# Patient Record
Sex: FEMALE | Race: WHITE | NOT HISPANIC OR LATINO | Employment: OTHER | URBAN - NONMETROPOLITAN AREA
[De-identification: names, ages, dates, MRNs, and addresses within clinical notes are randomized per-mention and may not be internally consistent; named-entity substitution may affect disease eponyms.]

---

## 2017-01-06 ENCOUNTER — TELEPHONE (OUTPATIENT)
Dept: PEDIATRICS | Facility: OTHER | Age: 32
End: 2017-01-06

## 2017-01-06 NOTE — TELEPHONE ENCOUNTER
"Addendum note to message below:  Call was placed to \"patient\" not \"clinic\" and pt states she never called the clinic today and that she is not waiting for an Rx.  [facility] spoke with  who states this message was sent in error.  "

## 2017-01-06 NOTE — TELEPHONE ENCOUNTER
Call placed to clinic and pt states she never called the clinic today and that she is not waiting for an Rx.  Sent to KATHIE Banerjee to clarify with sender.

## 2017-01-06 NOTE — TELEPHONE ENCOUNTER
12:46 PM    Reason for Call: Phone Call    Description: 's called and stated pt has been waiting awhile for meds    Was an appointment offered for this call? No    Preferred method for responding to this message: Telephone Call    If we cannot reach you directly, may we leave a detailed response at the number you provided? Yes    Can this message wait until your PCP/provider returns, if available today? Not applicable    Josephine Christy

## 2017-04-12 ENCOUNTER — HOSPITAL ENCOUNTER (EMERGENCY)
Facility: HOSPITAL | Age: 32
Discharge: HOME | End: 2017-04-12
Attending: PHYSICIAN ASSISTANT | Admitting: PHYSICIAN ASSISTANT
Payer: COMMERCIAL

## 2017-04-12 VITALS
OXYGEN SATURATION: 100 % | RESPIRATION RATE: 16 BRPM | TEMPERATURE: 98.7 F | HEART RATE: 89 BPM | DIASTOLIC BLOOD PRESSURE: 58 MMHG | SYSTOLIC BLOOD PRESSURE: 117 MMHG | HEIGHT: 66 IN

## 2017-04-12 DIAGNOSIS — R20.2 PARESTHESIA: ICD-10-CM

## 2017-04-12 DIAGNOSIS — R11.2 NON-INTRACTABLE VOMITING WITH NAUSEA, UNSPECIFIED VOMITING TYPE: ICD-10-CM

## 2017-04-12 DIAGNOSIS — R10.84 ABDOMINAL PAIN, GENERALIZED: ICD-10-CM

## 2017-04-12 DIAGNOSIS — R53.1 WEAKNESS: ICD-10-CM

## 2017-04-12 DIAGNOSIS — E86.0 DEHYDRATION: ICD-10-CM

## 2017-04-12 DIAGNOSIS — R06.4 HYPERVENTILATION: ICD-10-CM

## 2017-04-12 LAB
ALBUMIN SERPL-MCNC: 4.4 G/DL (ref 3.4–5)
ALBUMIN UR-MCNC: NEGATIVE MG/DL
ALP SERPL-CCNC: 60 U/L (ref 40–150)
ALT SERPL W P-5'-P-CCNC: 16 U/L (ref 0–50)
ANION GAP SERPL CALCULATED.3IONS-SCNC: 12 MMOL/L (ref 3–14)
APPEARANCE UR: CLEAR
AST SERPL W P-5'-P-CCNC: 11 U/L (ref 0–45)
BASOPHILS # BLD AUTO: 0 10E9/L (ref 0–0.2)
BASOPHILS NFR BLD AUTO: 0 %
BILIRUB SERPL-MCNC: 0.9 MG/DL (ref 0.2–1.3)
BILIRUB UR QL STRIP: NEGATIVE
BUN SERPL-MCNC: 7 MG/DL (ref 7–30)
CALCIUM SERPL-MCNC: 9.1 MG/DL (ref 8.5–10.1)
CHLORIDE SERPL-SCNC: 107 MMOL/L (ref 94–109)
CO2 SERPL-SCNC: 21 MMOL/L (ref 20–32)
COLOR UR AUTO: ABNORMAL
CREAT SERPL-MCNC: 0.56 MG/DL (ref 0.52–1.04)
CRP SERPL-MCNC: <2.9 MG/L (ref 0–8)
DIFFERENTIAL METHOD BLD: ABNORMAL
EOSINOPHIL # BLD AUTO: 0 10E9/L (ref 0–0.7)
EOSINOPHIL NFR BLD AUTO: 0.4 %
ERYTHROCYTE [DISTWIDTH] IN BLOOD BY AUTOMATED COUNT: 13.3 % (ref 10–15)
GFR SERPL CREATININE-BSD FRML MDRD: ABNORMAL ML/MIN/1.7M2
GLUCOSE SERPL-MCNC: 106 MG/DL (ref 70–99)
GLUCOSE UR STRIP-MCNC: NEGATIVE MG/DL
HCG UR QL: NEGATIVE
HCT VFR BLD AUTO: 37.1 % (ref 35–47)
HGB BLD-MCNC: 12.9 G/DL (ref 11.7–15.7)
HGB UR QL STRIP: NEGATIVE
IMM GRANULOCYTES # BLD: 0 10E9/L (ref 0–0.4)
IMM GRANULOCYTES NFR BLD: 0.3 %
KETONES UR STRIP-MCNC: 10 MG/DL
LEUKOCYTE ESTERASE UR QL STRIP: NEGATIVE
LIPASE SERPL-CCNC: 92 U/L (ref 73–393)
LYMPHOCYTES # BLD AUTO: 0.4 10E9/L (ref 0.8–5.3)
LYMPHOCYTES NFR BLD AUTO: 6.1 %
MAGNESIUM SERPL-MCNC: 1.7 MG/DL (ref 1.6–2.3)
MCH RBC QN AUTO: 28.1 PG (ref 26.5–33)
MCHC RBC AUTO-ENTMCNC: 34.8 G/DL (ref 31.5–36.5)
MCV RBC AUTO: 81 FL (ref 78–100)
MONOCYTES # BLD AUTO: 0.3 10E9/L (ref 0–1.3)
MONOCYTES NFR BLD AUTO: 3.6 %
NEUTROPHILS # BLD AUTO: 6.4 10E9/L (ref 1.6–8.3)
NEUTROPHILS NFR BLD AUTO: 89.6 %
NITRATE UR QL: NEGATIVE
NRBC # BLD AUTO: 0 10*3/UL
NRBC BLD AUTO-RTO: 0 /100
PH UR STRIP: 8 PH (ref 4.7–8)
PLATELET # BLD AUTO: 169 10E9/L (ref 150–450)
POTASSIUM SERPL-SCNC: 3.5 MMOL/L (ref 3.4–5.3)
PROT SERPL-MCNC: 7.4 G/DL (ref 6.8–8.8)
RBC # BLD AUTO: 4.59 10E12/L (ref 3.8–5.2)
SODIUM SERPL-SCNC: 140 MMOL/L (ref 133–144)
SP GR UR STRIP: 1.01 (ref 1–1.03)
URN SPEC COLLECT METH UR: ABNORMAL
UROBILINOGEN UR STRIP-MCNC: NORMAL MG/DL (ref 0–2)
WBC # BLD AUTO: 7.2 10E9/L (ref 4–11)

## 2017-04-12 PROCEDURE — 81003 URINALYSIS AUTO W/O SCOPE: CPT | Performed by: PHYSICIAN ASSISTANT

## 2017-04-12 PROCEDURE — 96374 THER/PROPH/DIAG INJ IV PUSH: CPT

## 2017-04-12 PROCEDURE — 99284 EMERGENCY DEPT VISIT MOD MDM: CPT | Mod: 25

## 2017-04-12 PROCEDURE — 85025 COMPLETE CBC W/AUTO DIFF WBC: CPT | Performed by: PHYSICIAN ASSISTANT

## 2017-04-12 PROCEDURE — 86140 C-REACTIVE PROTEIN: CPT | Performed by: PHYSICIAN ASSISTANT

## 2017-04-12 PROCEDURE — 83735 ASSAY OF MAGNESIUM: CPT | Performed by: PHYSICIAN ASSISTANT

## 2017-04-12 PROCEDURE — 81025 URINE PREGNANCY TEST: CPT | Performed by: PHYSICIAN ASSISTANT

## 2017-04-12 PROCEDURE — 99284 EMERGENCY DEPT VISIT MOD MDM: CPT | Performed by: PHYSICIAN ASSISTANT

## 2017-04-12 PROCEDURE — 83690 ASSAY OF LIPASE: CPT | Performed by: PHYSICIAN ASSISTANT

## 2017-04-12 PROCEDURE — 25000128 H RX IP 250 OP 636: Performed by: PHYSICIAN ASSISTANT

## 2017-04-12 PROCEDURE — 36415 COLL VENOUS BLD VENIPUNCTURE: CPT | Performed by: PHYSICIAN ASSISTANT

## 2017-04-12 PROCEDURE — 96361 HYDRATE IV INFUSION ADD-ON: CPT

## 2017-04-12 PROCEDURE — 80053 COMPREHEN METABOLIC PANEL: CPT | Performed by: PHYSICIAN ASSISTANT

## 2017-04-12 RX ORDER — SODIUM CHLORIDE 9 MG/ML
1000 INJECTION, SOLUTION INTRAVENOUS CONTINUOUS
Status: DISCONTINUED | OUTPATIENT
Start: 2017-04-12 | End: 2017-04-12 | Stop reason: HOSPADM

## 2017-04-12 RX ORDER — ONDANSETRON 4 MG/1
4 TABLET, FILM COATED ORAL EVERY 8 HOURS PRN
Qty: 10 TABLET | Refills: 0 | Status: SHIPPED | OUTPATIENT
Start: 2017-04-12 | End: 2018-04-04

## 2017-04-12 RX ORDER — LORAZEPAM 2 MG/ML
0.5 INJECTION INTRAMUSCULAR ONCE
Status: COMPLETED | OUTPATIENT
Start: 2017-04-12 | End: 2017-04-12

## 2017-04-12 RX ADMIN — SODIUM CHLORIDE 1000 ML: 9 INJECTION, SOLUTION INTRAVENOUS at 16:29

## 2017-04-12 RX ADMIN — SODIUM CHLORIDE 1000 ML: 9 INJECTION, SOLUTION INTRAVENOUS at 17:22

## 2017-04-12 RX ADMIN — LORAZEPAM 0.5 MG: 2 INJECTION, SOLUTION INTRAMUSCULAR; INTRAVENOUS at 16:29

## 2017-04-12 ASSESSMENT — ENCOUNTER SYMPTOMS
SPEECH DIFFICULTY: 0
ACTIVITY CHANGE: 1
APPETITE CHANGE: 1
CHILLS: 1
WEAKNESS: 1
VOMITING: 1
PALPITATIONS: 1
FEVER: 1
NAUSEA: 1
FATIGUE: 1
ABDOMINAL PAIN: 1
ABDOMINAL DISTENTION: 0
DIARRHEA: 0
DIZZINESS: 1
NUMBNESS: 1
COUGH: 0
PHOTOPHOBIA: 0
CHEST TIGHTNESS: 1
LIGHT-HEADEDNESS: 1
HEADACHES: 1
WHEEZING: 0
SHORTNESS OF BREATH: 1

## 2017-04-12 NOTE — DISCHARGE INSTRUCTIONS
I could not find a reason for your symptoms today.  This does not mean that nothing is wrong.     Go home and rest.    Please follow-up in the clinic for recheck.     Please return here for any other concerns or questions.

## 2017-04-12 NOTE — ED AVS SNAPSHOT
[facility]    [address] [address]    Phone:  [phone]                                       Morelia Rodriguez   MRN: [id number]    Department:  [facility]   Date of Visit:  4/12/2017           Patient Information     Date Of Birth          1985        Your diagnoses for this visit were:     Non-intractable vomiting with nausea, unspecified vomiting type     Paresthesia     Weakness     Abdominal pain, generalized     Hyperventilation     Dehydration        You were seen by Navjot Atwood PA-C.      Follow-up Information     Schedule an appointment as soon as possible for a visit with Aliyah Domínguez MD.    Specialty:  Family Practice    Contact information:    [facility]  [address]  [facility] [address] [phone]          Follow up with [facility].    Specialty:  EMERGENCY MEDICINE    Why:  If symptoms worsen    Contact information:    [address] [address] [phone]    Additional information:    From [place] Area: Take [address]. Turn left at [address]. Turn left at the first stoplight on [address]. At the first stop sign, take a right onto [address]. Take a left into the parking lot and continue through until you reach the North enterance of the building.       From [place]: Take [address]. Take the [address] ramp towards [place]. Turn left onto [address]. Take a slight right onto [address]. Turn left at the first stoplight on [address]. At the first stop sign, take a right onto [address]. Take a left into the parking lot and continue through until you reach the North enterance of the building.       From [place]: Take [address]. Take a right at [address]. At the first stop sign, take a right onto [address]. Take a left into the parking lot and continue through until you reach the North  enterance of the building.         Discharge Instructions       I could not find a reason for your symptoms today.  This does not mean that nothing is wrong.     Go home and rest.    Please follow-up in the clinic for recheck.     Please return here for any other concerns or questions.        Review of your medicines      START taking        Dose / Directions Last dose taken    ondansetron 4 MG tablet   Commonly known as:  ZOFRAN   Dose:  4 mg   Quantity:  10 tablet        Take 1 tablet (4 mg) by mouth every 8 hours as needed for nausea   Refills:  0          Our records show that you are taking the medicines listed below. If these are incorrect, please call your family doctor or clinic.        Dose / Directions Last dose taken    vitamin B complex with vitamin C Tabs tablet   Dose:  1 tablet        Take 1 tablet by mouth daily   Refills:  0        VITAMIN C PO        Refills:  0                Prescriptions were sent or printed at these locations (1 Prescription)                   [id number] [place] [facility] [address]   [address] [place] [address]    Telephone:  [phone]   Fax:  [phone]   Hours:                  E-Prescribed (1 of 1)         ondansetron (ZOFRAN) 4 MG tablet                Procedures and tests performed during your visit     CBC with platelets differential    CRP inflammation    Comprehensive metabolic panel    HCG qualitative urine    Lipase    Magnesium    Peripheral IV catheter    UA reflex to Microscopic      Orders Needing Specimen Collection     None      Pending Results     No orders found from 4/10/2017 to 4/13/2017.            Pending Culture Results     No orders found from 4/10/2017 to 4/13/2017.            Thank you for choosing [facility]       Thank you for choosing [facility] for your care. Our goal is always to provide you with excellent care. Hearing back from our patients is one way we can continue to improve our services. Please take a few minutes to  complete the written survey that you may receive in the mail after you visit with us. Thank you!        [employer] Information     [employer] gives you secure access to your electronic health record. If you see a primary care provider, you can also send messages to your care team and make appointments. If you have questions, please call your primary care clinic.  If you do not have a primary care provider, please call [phone] and they will assist you.        Care EveryWhere ID     This is your Care EveryWhere ID. This could be used by other organizations to access your [facility] medical records  [id number]        After Visit Summary       This is your record. Keep this with you and show to your community pharmacist(s) and doctor(s) at your next visit.

## 2017-04-12 NOTE — ED NOTES
Pt brought in by mother for evaluation of nausea and vomiting intermittently since last night. Pt reports some tingling in extremities and some sob. Pt pale and appears anxious. Speaking very slowly and quietly. Mother at bedside and answering most questions for pt. Mother reports pt has tried activated charcoal at home to treat sx with minimal relief. Mother also reports pt may be dehydrated and is concerned about possible UTI due to a hx of infection. Pt has no complaints of dysuria or frequency at this time.

## 2017-04-12 NOTE — ED PROVIDER NOTES
"  History     Chief Complaint   Patient presents with     Nausea & Vomiting     Started last night after drank wine at passover.     The history is provided by the patient.     Morelia Rodriguez is a 31 year old female who presented to the ED ambulatory along with mother for evaluation of a multitude of complaints.  She was apparently drinking some wine during [place] last night.  Today she has abdominal cramping, one episode of diarrhea, some nausea, weakness, dizziness, and paresthesias or her face and arms.      Past Medical History:   Diagnosis Date     Anemia     after 1st pregnancy, hgb 6, ok for auto transfusion or for her mom to donate.     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      Social History Main Topics     Smoking status: Never Smoker     Smokeless tobacco: Never Used      Comment: no passive exposure     Alcohol use Yes      Comment: rarely     Drug use: No     Sexual activity: Yes     Partners: Male     Other Topics Concern      Service No     Blood Transfusions Yes     would like to discuss     Caffeine Concern No     Special Diet No     Exercise Yes     DVDs  3-5x/week  30 min     Seat Belt Yes     Self-Exams No     Social History Narrative      Family History   Problem Relation Age of Onset     Respiratory Mother      chronic bronchitis     Depression Mother      Family History Negative Father      Family History Negative Brother      Coronary Artery Disease " "Paternal Grandfather      AMI     HEART DISEASE Maternal Grandmother      CHF, lung ca??/mass       I have reviewed the Medications, Allergies, Past Medical and Surgical History, and Social History in the Epic system.    Review of Systems   Constitutional: Positive for activity change, appetite change, chills, fatigue and fever.   HENT: Negative for congestion.    Eyes: Negative for photophobia and visual disturbance.   Respiratory: Positive for chest tightness and shortness of breath. Negative for cough and wheezing.    Cardiovascular: Positive for palpitations. Negative for chest pain and leg swelling.   Gastrointestinal: Positive for abdominal pain, nausea and vomiting. Negative for abdominal distention and diarrhea.   Genitourinary: Negative.    Skin: Negative.    Neurological: Positive for dizziness, weakness, light-headedness, numbness and headaches. Negative for syncope and speech difficulty.       Physical Exam   BP: 116/58  Heart Rate: 106  Temp: 98.2  F (36.8  C)  Resp: 20 (pt hyperventalaing, c/o numbness and tingling in extremities..)  Height: 167.6 cm (5' 6\")  SpO2: 100 %  Physical Exam   Constitutional: She is oriented to person, place, and time. She appears well-developed and well-nourished. No distress.   HENT:   Mouth/Throat: Oropharynx is clear and moist.   Eyes: Conjunctivae and EOM are normal. Pupils are equal, round, and reactive to light.   Neck: Normal range of motion. Neck supple.   Cardiovascular: Regular rhythm.    Mild tachycardia    Pulmonary/Chest: Effort normal and breath sounds normal.   Abdominal: Soft. She exhibits no distension. There is no tenderness. There is no guarding.   Neurological: She is alert and oriented to person, place, and time.   Skin: Skin is warm and dry.   Psychiatric:   Anxious and hyperventilating    Nursing note and vitals reviewed.      ED Course     ED Course     Procedures        Medications   sodium chloride (PF) 0.9% PF flush 3 mL (not administered) "   sodium chloride (PF) 0.9% PF flush 3 mL (not administered)   0.9% sodium chloride BOLUS (0 mLs Intravenous Stopped 4/12/17 1722)     Followed by   0.9% sodium chloride BOLUS (0 mLs Intravenous Stopped 4/12/17 1812)     Followed by   0.9% sodium chloride infusion (not administered)   LORazepam (ATIVAN) injection 0.5 mg (0.5 mg Intravenous Given 4/12/17 1629)     Results for orders placed or performed during the hospital encounter of 04/12/17 (from the past 24 hour(s))   CBC with platelets differential   Result Value Ref Range    WBC 7.2 4.0 - 11.0 10e9/L    RBC Count 4.59 3.8 - 5.2 10e12/L    Hemoglobin 12.9 11.7 - 15.7 g/dL    Hematocrit 37.1 35.0 - 47.0 %    MCV 81 78 - 100 fl    MCH 28.1 26.5 - 33.0 pg    MCHC 34.8 31.5 - 36.5 g/dL    RDW 13.3 10.0 - 15.0 %    Platelet Count 169 150 - 450 10e9/L    Diff Method Automated Method     % Neutrophils 89.6 %    % Lymphocytes 6.1 %    % Monocytes 3.6 %    % Eosinophils 0.4 %    % Basophils 0.0 %    % Immature Granulocytes 0.3 %    Nucleated RBCs 0 0 /100    Absolute Neutrophil 6.4 1.6 - 8.3 10e9/L    Absolute Lymphocytes 0.4 (L) 0.8 - 5.3 10e9/L    Absolute Monocytes 0.3 0.0 - 1.3 10e9/L    Absolute Eosinophils 0.0 0.0 - 0.7 10e9/L    Absolute Basophils 0.0 0.0 - 0.2 10e9/L    Abs Immature Granulocytes 0.0 0 - 0.4 10e9/L    Absolute Nucleated RBC 0.0    Comprehensive metabolic panel   Result Value Ref Range    Sodium 140 133 - 144 mmol/L    Potassium 3.5 3.4 - 5.3 mmol/L    Chloride 107 94 - 109 mmol/L    Carbon Dioxide 21 20 - 32 mmol/L    Anion Gap 12 3 - 14 mmol/L    Glucose 106 (H) 70 - 99 mg/dL    Urea Nitrogen 7 7 - 30 mg/dL    Creatinine 0.56 0.52 - 1.04 mg/dL    GFR Estimate >90  Non  GFR Calc   >60 mL/min/1.7m2    GFR Estimate If Black >90   GFR Calc   >60 mL/min/1.7m2    Calcium 9.1 8.5 - 10.1 mg/dL    Bilirubin Total 0.9 0.2 - 1.3 mg/dL    Albumin 4.4 3.4 - 5.0 g/dL    Protein Total 7.4 6.8 - 8.8 g/dL    Alkaline Phosphatase 60  40 - 150 U/L    ALT 16 0 - 50 U/L    AST 11 0 - 45 U/L   Lipase   Result Value Ref Range    Lipase 92 73 - 393 U/L   CRP inflammation   Result Value Ref Range    CRP Inflammation <2.9 0.0 - 8.0 mg/L   Magnesium   Result Value Ref Range    Magnesium 1.7 1.6 - 2.3 mg/dL   UA reflex to Microscopic   Result Value Ref Range    Color Urine Light Yellow     Appearance Urine Clear     Glucose Urine Negative NEG mg/dL    Bilirubin Urine Negative NEG    Ketones Urine 10 (A) NEG mg/dL    Specific Gravity Urine 1.012 1.003 - 1.035    Blood Urine Negative NEG    pH Urine 8.0 4.7 - 8.0 pH    Protein Albumin Urine Negative NEG mg/dL    Urobilinogen mg/dL Normal 0.0 - 2.0 mg/dL    Nitrite Urine Negative NEG    Leukocyte Esterase Urine Negative NEG    Source Midstream Urine    HCG qualitative urine   Result Value Ref Range    HCG Qual Urine Negative NEG        Critical Care time:  none               Labs Ordered and Resulted from Time of ED Arrival Up to the Time of Departure from the ED   CBC WITH PLATELETS DIFFERENTIAL - Abnormal; Notable for the following:        Result Value    Absolute Lymphocytes 0.4 (*)     All other components within normal limits   COMPREHENSIVE METABOLIC PANEL - Abnormal; Notable for the following:     Glucose 106 (*)     All other components within normal limits   URINE MACROSCOPIC WITH REFLEX TO MICRO - Abnormal; Notable for the following:     Ketones Urine 10 (*)     All other components within normal limits   LIPASE   CRP INFLAMMATION   HCG QUALITATIVE URINE   MAGNESIUM   PERIPHERAL IV CATHETER       Assessments & Plan (with Medical Decision Making)   Work-up as above.  Symptoms entirely resolved with IV fluids and Ativan. Observed for > 3 hours and did well.  Safe for discharge with proper clinic follow-up.  Morelia and her mother voiced understanding and were agreeable. Discharged in stable and good condition, ambulatory without assistance.     I have reviewed the nursing notes.    I have reviewed  the findings, diagnosis, plan and need for follow up with the patient.    New Prescriptions    No medications on file       Final diagnoses:   Non-intractable vomiting with nausea, unspecified vomiting type   Paresthesia   Weakness   Abdominal pain, generalized   Hyperventilation   Dehydration       4/12/2017   [facility]     Navjot Atwood PA-C  04/12/17 1905

## 2017-04-12 NOTE — ED AVS SNAPSHOT
[facility]    [address]    [place] [address]    Phone:  [phone]                                       Morelia Rodriguez   MRN: [id number]    Department:  [facility]   Date of Visit:  4/12/2017           After Visit Summary Signature Page     I have received my discharge instructions, and my questions have been answered. I have discussed any challenges I see with this plan with the nurse or doctor.    ..........................................................................................................................................  Patient/Patient Representative Signature      ..........................................................................................................................................  Patient Representative Print Name and Relationship to Patient    ..................................................               ................................................  Date                                            Time    ..........................................................................................................................................  Reviewed by Signature/Title    ...................................................              ..............................................  Date                                                            Time

## 2017-04-13 NOTE — ED NOTES
Discharge instructions reviewed with patient and her mother with no questions or concerns. Pt to  medication at [facility] pharmacy. Vital stable.

## 2017-11-12 ENCOUNTER — HEALTH MAINTENANCE LETTER (OUTPATIENT)
Age: 32
End: 2017-11-12

## 2018-04-04 ENCOUNTER — OFFICE VISIT (OUTPATIENT)
Dept: FAMILY MEDICINE | Facility: OTHER | Age: 33
End: 2018-04-04
Attending: FAMILY MEDICINE
Payer: COMMERCIAL

## 2018-04-04 VITALS
WEIGHT: 163 LBS | DIASTOLIC BLOOD PRESSURE: 70 MMHG | OXYGEN SATURATION: 99 % | SYSTOLIC BLOOD PRESSURE: 136 MMHG | BODY MASS INDEX: 26.31 KG/M2 | HEART RATE: 68 BPM | TEMPERATURE: 97.8 F

## 2018-04-04 DIAGNOSIS — K90.41 GLUTEN-SENSITIVE ENTEROPATHY: ICD-10-CM

## 2018-04-04 DIAGNOSIS — G47.09 OTHER INSOMNIA: Primary | ICD-10-CM

## 2018-04-04 DIAGNOSIS — E78.5 HYPERLIPIDEMIA WITH TARGET LDL LESS THAN 130: ICD-10-CM

## 2018-04-04 DIAGNOSIS — R11.0 NAUSEA: ICD-10-CM

## 2018-04-04 DIAGNOSIS — F90.2 ATTENTION DEFICIT HYPERACTIVITY DISORDER (ADHD), COMBINED TYPE: ICD-10-CM

## 2018-04-04 LAB
ANION GAP SERPL CALCULATED.3IONS-SCNC: 4 MMOL/L (ref 3–14)
BUN SERPL-MCNC: 8 MG/DL (ref 7–30)
CALCIUM SERPL-MCNC: 8.9 MG/DL (ref 8.5–10.1)
CHLORIDE SERPL-SCNC: 108 MMOL/L (ref 94–109)
CO2 SERPL-SCNC: 28 MMOL/L (ref 20–32)
CREAT SERPL-MCNC: 0.64 MG/DL (ref 0.52–1.04)
GFR SERPL CREATININE-BSD FRML MDRD: >90 ML/MIN/1.7M2
GLUCOSE SERPL-MCNC: 90 MG/DL (ref 70–99)
MAGNESIUM SERPL-MCNC: 2.3 MG/DL (ref 1.6–2.3)
POTASSIUM SERPL-SCNC: 4.2 MMOL/L (ref 3.4–5.3)
SODIUM SERPL-SCNC: 140 MMOL/L (ref 133–144)

## 2018-04-04 PROCEDURE — 36415 COLL VENOUS BLD VENIPUNCTURE: CPT | Mod: ZL | Performed by: FAMILY MEDICINE

## 2018-04-04 PROCEDURE — 83735 ASSAY OF MAGNESIUM: CPT | Mod: ZL | Performed by: FAMILY MEDICINE

## 2018-04-04 PROCEDURE — G0463 HOSPITAL OUTPT CLINIC VISIT: HCPCS

## 2018-04-04 PROCEDURE — 99215 OFFICE O/P EST HI 40 MIN: CPT | Performed by: FAMILY MEDICINE

## 2018-04-04 PROCEDURE — 80048 BASIC METABOLIC PNL TOTAL CA: CPT | Mod: ZL | Performed by: FAMILY MEDICINE

## 2018-04-04 RX ORDER — PRENATAL VIT/IRON FUM/FOLIC AC 27MG-0.8MG
1 TABLET ORAL DAILY
Qty: 100 TABLET | Refills: 3 | Status: SHIPPED | OUTPATIENT
Start: 2018-04-04 | End: 2018-06-07

## 2018-04-04 ASSESSMENT — ANXIETY QUESTIONNAIRES
6. BECOMING EASILY ANNOYED OR IRRITABLE: MORE THAN HALF THE DAYS
1. FEELING NERVOUS, ANXIOUS, OR ON EDGE: SEVERAL DAYS
2. NOT BEING ABLE TO STOP OR CONTROL WORRYING: NOT AT ALL
7. FEELING AFRAID AS IF SOMETHING AWFUL MIGHT HAPPEN: MORE THAN HALF THE DAYS
3. WORRYING TOO MUCH ABOUT DIFFERENT THINGS: SEVERAL DAYS
5. BEING SO RESTLESS THAT IT IS HARD TO SIT STILL: NOT AT ALL
GAD7 TOTAL SCORE: 7
IF YOU CHECKED OFF ANY PROBLEMS ON THIS QUESTIONNAIRE, HOW DIFFICULT HAVE THESE PROBLEMS MADE IT FOR YOU TO DO YOUR WORK, TAKE CARE OF THINGS AT HOME, OR GET ALONG WITH OTHER PEOPLE: VERY DIFFICULT

## 2018-04-04 ASSESSMENT — PATIENT HEALTH QUESTIONNAIRE - PHQ9: 5. POOR APPETITE OR OVEREATING: SEVERAL DAYS

## 2018-04-04 ASSESSMENT — PAIN SCALES - GENERAL: PAINLEVEL: NO PAIN (0)

## 2018-04-04 NOTE — PROGRESS NOTES
SUBJECTIVE:                                                    Morelia Rodriguez is a 32 year old female who presents to clinic today for the following health issues:        Sinus      Duration: been a while off and on    Description (location/character/radiation): nose    Intensity:  mild    Accompanying signs and symptoms: wonders if the mold in the house is causing the nasal congestions. Itchy. Watery eyes, hands turn red when washing in the bathroom where the mold is at. Kids having similar issues with eyes. Fatigue and heart palpitations and having trouble sleeping.. Feels like electrolites may be down also.    History (similar episodes/previous evaluation): None    Precipitating or alleviating factors: None    Therapies tried and outcome: been trying to supplementing for electrolites.       Insomnia      Duration: several months    Description  Frequency of insomnia:  several times a week  Time to fall asleep: many hours of tossing and turning if she is having heart palpitations, panic attacks and other issues  Middle of night awakening:  Most night  Early morning awakening:  several times a week    Accompanying signs and symptoms:  restless legs, excessive daytime sleepiness, fatigue, morning headache, depression/mood changes and feels foggy and feels all over the place. Feels very agitated.    History  Similar episodes in past:  YES- many years  Previous evaluation/sleep study:  no     Precipitating or alleviating factors:  New stressful situation: YES- yes ex and child support, housing is fixing the mold in the house. Kicked out of the house today to get that fixed. Special needs child with many appointments also has 2 other children to take to their appointments  Caffeine intake after lunchtime: no   OTC decongestants: no   Any new medications: no     Therapies tried and outcome: trying to take in more electrolites.      Problem list and histories reviewed & adjusted, as indicated.  Additional history:  "as documented    Patient Active Problem List   Diagnosis      delivery delivered     Anemia     Hyperlipidemia with target LDL less than 130     ACP (advance care planning)     Past Surgical History:   Procedure Laterality Date      SECTION  2012    Pregnancy-full term \"Shelomoh\" \"Oscar\"      SECTION        SECTION  2014    Procedure:  SECTION;  Surgeon: Gaston Norton MD;  Location: [facility]     wisdom teeth extraction         Social History   Substance Use Topics     Smoking status: Never Smoker     Smokeless tobacco: Never Used      Comment: no passive exposure     Alcohol use Yes      Comment: rarely 1/year     Family History   Problem Relation Age of Onset     Respiratory Mother      chronic bronchitis yearly     Depression Mother      fatigue     GERD Father      Sleep Disorder Father      Irritable Bowel Syndrome Father      HEART DISEASE Maternal Grandmother      CHF, lung ca??/mass     LUNG DISEASE Maternal Grandfather      +tobacco     Depression Paternal Grandmother      ADHD     Sleep Apnea Paternal Grandmother      insomnia?     Coronary Artery Disease Paternal Grandfather      AMI     Blood Disease Brother      varicose veins     Cardiomyopathy Brother      large heart         No current outpatient prescriptions on file.     Allergies   Allergen Reactions     Latex Rash     Labs reviewed in EPIC    ROS:  Constitutional, HEENT, cardiovascular, pulmonary, gi and gu systems are negative, except as otherwise noted.    OBJECTIVE:                                                    /70  Pulse 68  Temp 97.8  F (36.6  C) (Tympanic)  Wt 163 lb (73.9 kg)  SpO2 99%  BMI 26.31 kg/m2  Body mass index is 26.31 kg/(m^2).  GENERAL APPEARANCE: healthy, alert and no distress  HENT: ear canals and TM's normal and nose and mouth without ulcers or lesions  NECK: no adenopathy, no asymmetry, masses, or scars and thyroid normal to palpation  RESP: lungs clear to " auscultation - no rales, rhonchi or wheezes  CV: regular rates and rhythm, normal S1 S2, no S3 or S4 and no murmur, click or rub  ABDOMEN: soft, nontender, without hepatosplenomegaly or masses and bowel sounds normal  SKIN: no suspicious lesions or rashes  PSYCH: mentation appears normal and affect normal/bright       ASSESSMENT/PLAN:                                                    (G47.09) Other insomnia  (primary encounter diagnosis)  Comment: recommend getting back on vitamins, she prefers the bio-available type  Discussed with family history would recommend medications but she doesn't like to take any types of conventional medications  Plan: Prenatal Vit-Fe Fumarate-FA (PRENATAL         MULTIVITAMIN PLUS IRON) 27-0.8 MG TABS per         tablet, Basic metabolic panel, Magnesium        Try melatonin OTC 5-10 mg qhs, discussed good sleep hygeine also and exercise as she sounds to be low on seratonin as well    (F90.2) Attention deficit hyperactivity disorder (ADHD), combined type  Comment: was tested in the past  Plan: Basic metabolic panel, Magnesium        Hasn't been on meds, discussed exercise again as she is opposed to meds    (E78.5) Hyperlipidemia with target LDL less than 130  Comment:   Plan: Prenatal Vit-Fe Fumarate-FA (PRENATAL         MULTIVITAMIN PLUS IRON) 27-0.8 MG TABS per         tablet        Due for CPE and lipid panel    (K90.0) Gluten-sensitive enteropathy  Comment:   Plan: she has been following her diet very well    (R11.0) Nausea  Comment: more in the morning  Plan: discussed possible gastritis, she has some natural things she would like to try    Over 40 min spent with patient, over 75% education and counseling trying to talk about breaking the cycle of lack of sleep and how this affects her menstrual cycle, GI, mental, respiratory and fatigue all of which she has concerns about today, she spent quite a bit of time on google looking up symptoms and was convinced electrolytes are the  cause so I did elect to do labs but recommend getting lifestyle changes made which she prefers to do.    Patient was agreeable to this plan and had no further questions.  See Patient Instructions    Aliayh Domínguez MD  [facility]

## 2018-04-04 NOTE — NURSING NOTE
"Chief Complaint   Patient presents with     Sinus Problem     Fatigue       Initial /70  Pulse 68  Temp 97.8  F (36.6  C) (Tympanic)  Wt 163 lb (73.9 kg)  SpO2 99%  BMI 26.31 kg/m2 Estimated body mass index is 26.31 kg/(m^2) as calculated from the following:    Height as of 4/12/17: 5' 6\" (1.676 m).    Weight as of this encounter: 163 lb (73.9 kg).  Medication Reconciliation: complete     Dottie Ly    "

## 2018-04-04 NOTE — PATIENT INSTRUCTIONS
1.  Calcium/magnesium/zinc -- 1 tablet 2x/day  2.  Melatonin 5-10 mg at bedtime  3.  Increase exercise  4.  No electronics 1 hr before bed

## 2018-04-04 NOTE — MR AVS SNAPSHOT
After Visit Summary   4/4/2018    Morelia Rodriguez    MRN: [id number]           Patient Information     Date Of Birth          1985        Visit Information        Provider Department      4/4/2018 10:00 AM Aliyah Domínguez MD [place] [facility]        Today's Diagnoses     Other insomnia    -  1    Attention deficit hyperactivity disorder (ADHD), combined type        Hyperlipidemia with target LDL less than 130        Gluten-sensitive enteropathy          Care Instructions    1.  Calcium/magnesium/zinc -- 1 tablet 2x/day  2.  Melatonin 5-10 mg at bedtime  3.  Increase exercise  4.  No electronics 1 hr before bed            Follow-ups after your visit        Your next 10 appointments already scheduled     May 18, 2018  1:30 PM CDT   (Arrive by 1:15 PM)   Office Visit with MD Yvette Alexandreview Darlene Cuellar ([facility] )    [address]  [place] [address]   [phone]           Parent or Legal Guardian is required to be present at the time of the  minors appointment.  Bring a current list of meds and any records pertaining to this visit.  For Physicals, please bring immunization records and any forms needing to be filled out.  Please arrive 15 minutes early to register.              Who to contact     If you have questions or need follow up information about today's clinic visit or your schedule please contact [facility] [place] directly at [phone].  Normal or non-critical lab and imaging results will be communicated to you by MyChart, letter or phone within 4 business days after the clinic has received the results. If you do not hear from us within 7 days, please contact the clinic through MyChart or phone. If you have a critical or abnormal lab result, we will notify you by phone as soon as possible.  Submit refill requests through [employer] or call your pharmacy and they will forward the refill request to us. Please allow 3 business  days for your refill to be completed.          Additional Information About Your Visit        MyChart Information     [employer] gives you secure access to your electronic health record. If you see a primary care provider, you can also send messages to your care team and make appointments. If you have questions, please call your primary care clinic.  If you do not have a primary care provider, please call [phone] and they will assist you.        Care EveryWhere ID     This is your Care EveryWhere ID. This could be used by other organizations to access your [place] medical records  [id number]        Your Vitals Were     Pulse Temperature Pulse Oximetry BMI (Body Mass Index)          68 97.8  F (36.6  C) (Tympanic) 99% 26.31 kg/m2         Blood Pressure from Last 3 Encounters:   04/04/18 136/70   04/12/17 117/58   10/07/16 110/70    Weight from Last 3 Encounters:   04/04/18 163 lb (73.9 kg)   10/07/16 159 lb (72.1 kg)   08/18/15 201 lb (91.2 kg)              We Performed the Following     Basic metabolic panel     Magnesium          Today's Medication Changes          These changes are accurate as of 4/4/18 11:03 AM.  If you have any questions, ask your nurse or doctor.               Start taking these medicines.        Dose/Directions    prenatal multivitamin plus iron 27-0.8 MG Tabs per tablet   Used for:  Hyperlipidemia with target LDL less than 130, Other insomnia   Started by:  Aliyah Domínguez MD        Dose:  1 tablet   Take 1 tablet by mouth daily   Quantity:  100 tablet   Refills:  3            Where to get your medicines      These medications were sent to [facility] - [address]  [address] [place] [address]     Phone:  [phone]     prenatal multivitamin plus iron 27-0.8 MG Tabs per tablet                Primary Care Provider Office Phone # Fax #    Aliyah Domínguez [phone]       [facility] [place] [address]  [place]  [address]        Equal Access to Services     [facility]: Hadii aad ku hadsantiagovalencia Colleenmisty, wahoneyda jasmynejeannineha, qachiquitaean milnerconradvibha benites. So [facility] [phone].    ATENCIÓN: Si habla español, tiene a saez disposición servicios gratuitos de asistencia lingüística. Llame al [phone].    We comply with applicable federal civil rights laws and [place] laws. We do not discriminate on the basis of race, color, national origin, age, disability, sex, sexual orientation, or gender identity.            Thank you!     [facility] you [facility]  [facility] your [facility]. Our goal is always to provide you with excellent care. Hearing back from our patients is one way we can continue to improve our services. Please take a few minutes to complete the written survey that you may receive in the mail after your visit with us. Thank you!             Your Updated Medication List - Protect others around you: Learn how to safely use, store and throw away your medicines at www.disposemymeds.org.          This list is accurate as of 4/4/18 11:03 AM.  Always use your most recent med list.                   Brand Name Dispense Instructions for use Diagnosis    prenatal multivitamin plus iron 27-0.8 MG Tabs per tablet     100 tablet    Take 1 tablet by mouth daily    Hyperlipidemia with target LDL less than 130, Other insomnia

## 2018-04-04 NOTE — LETTER
April 4, 2018      Morelia Rodriguez  [address]   [place] [address]        Dear ,    We are writing to inform you of your test results.    Your test results fall within the expected range(s) or remain unchanged from previous results.  Please continue with current treatment plan.    Resulted Orders   Basic metabolic panel   Result Value Ref Range    Sodium 140 133 - 144 mmol/L    Potassium 4.2 3.4 - 5.3 mmol/L    Chloride 108 94 - 109 mmol/L    Carbon Dioxide 28 20 - 32 mmol/L    Anion Gap 4 3 - 14 mmol/L    Glucose 90 70 - 99 mg/dL    Urea Nitrogen 8 7 - 30 mg/dL    Creatinine 0.64 0.52 - 1.04 mg/dL    GFR Estimate >90 >60 mL/min/1.7m2      Comment:      Non  GFR Calc    GFR Estimate If Black >90 >60 mL/min/1.7m2      Comment:       GFR Calc    Calcium 8.9 8.5 - 10.1 mg/dL   Magnesium   Result Value Ref Range    Magnesium 2.3 1.6 - 2.3 mg/dL       If you have any questions or concerns, please call the clinic at the number listed above.       Sincerely,        Aliyah Domínguez MD

## 2018-04-05 ASSESSMENT — ANXIETY QUESTIONNAIRES: GAD7 TOTAL SCORE: 7

## 2018-04-05 ASSESSMENT — PATIENT HEALTH QUESTIONNAIRE - PHQ9: SUM OF ALL RESPONSES TO PHQ QUESTIONS 1-9: 6

## 2018-06-07 ENCOUNTER — OFFICE VISIT (OUTPATIENT)
Dept: FAMILY MEDICINE | Facility: OTHER | Age: 33
End: 2018-06-07
Attending: FAMILY MEDICINE
Payer: COMMERCIAL

## 2018-06-07 VITALS
OXYGEN SATURATION: 99 % | WEIGHT: 161 LBS | TEMPERATURE: 97.8 F | DIASTOLIC BLOOD PRESSURE: 58 MMHG | BODY MASS INDEX: 25.99 KG/M2 | SYSTOLIC BLOOD PRESSURE: 96 MMHG | HEART RATE: 77 BPM

## 2018-06-07 DIAGNOSIS — G47.09 OTHER INSOMNIA: ICD-10-CM

## 2018-06-07 DIAGNOSIS — R11.0 NAUSEA: ICD-10-CM

## 2018-06-07 DIAGNOSIS — F41.1 GAD (GENERALIZED ANXIETY DISORDER): ICD-10-CM

## 2018-06-07 DIAGNOSIS — N94.6 DYSMENORRHEA: ICD-10-CM

## 2018-06-07 DIAGNOSIS — E78.5 HYPERLIPIDEMIA WITH TARGET LDL LESS THAN 130: Primary | ICD-10-CM

## 2018-06-07 LAB
ALBUMIN SERPL-MCNC: 4.1 G/DL (ref 3.4–5)
ALP SERPL-CCNC: 62 U/L (ref 40–150)
ALT SERPL W P-5'-P-CCNC: 23 U/L (ref 0–50)
ANION GAP SERPL CALCULATED.3IONS-SCNC: 4 MMOL/L (ref 3–14)
AST SERPL W P-5'-P-CCNC: 13 U/L (ref 0–45)
BILIRUB SERPL-MCNC: 0.5 MG/DL (ref 0.2–1.3)
BUN SERPL-MCNC: 9 MG/DL (ref 7–30)
CALCIUM SERPL-MCNC: 8.8 MG/DL (ref 8.5–10.1)
CHLORIDE SERPL-SCNC: 107 MMOL/L (ref 94–109)
CHOLEST SERPL-MCNC: 117 MG/DL
CO2 SERPL-SCNC: 28 MMOL/L (ref 20–32)
CREAT SERPL-MCNC: 0.62 MG/DL (ref 0.52–1.04)
GFR SERPL CREATININE-BSD FRML MDRD: >90 ML/MIN/1.7M2
GLUCOSE SERPL-MCNC: 78 MG/DL (ref 70–99)
HDLC SERPL-MCNC: 49 MG/DL
LDLC SERPL CALC-MCNC: 46 MG/DL
NONHDLC SERPL-MCNC: 68 MG/DL
POTASSIUM SERPL-SCNC: 4.2 MMOL/L (ref 3.4–5.3)
PROT SERPL-MCNC: 7.4 G/DL (ref 6.8–8.8)
SODIUM SERPL-SCNC: 139 MMOL/L (ref 133–144)
TRIGL SERPL-MCNC: 108 MG/DL

## 2018-06-07 PROCEDURE — 80061 LIPID PANEL: CPT | Mod: ZL | Performed by: FAMILY MEDICINE

## 2018-06-07 PROCEDURE — 36415 COLL VENOUS BLD VENIPUNCTURE: CPT | Mod: ZL | Performed by: FAMILY MEDICINE

## 2018-06-07 PROCEDURE — 99214 OFFICE O/P EST MOD 30 MIN: CPT | Performed by: FAMILY MEDICINE

## 2018-06-07 PROCEDURE — G0463 HOSPITAL OUTPT CLINIC VISIT: HCPCS

## 2018-06-07 PROCEDURE — 80053 COMPREHEN METABOLIC PANEL: CPT | Mod: ZL | Performed by: FAMILY MEDICINE

## 2018-06-07 ASSESSMENT — PAIN SCALES - GENERAL: PAINLEVEL: NO PAIN (0)

## 2018-06-07 ASSESSMENT — ANXIETY QUESTIONNAIRES
7. FEELING AFRAID AS IF SOMETHING AWFUL MIGHT HAPPEN: NOT AT ALL
5. BEING SO RESTLESS THAT IT IS HARD TO SIT STILL: NOT AT ALL
6. BECOMING EASILY ANNOYED OR IRRITABLE: SEVERAL DAYS
4. TROUBLE RELAXING: NOT AT ALL
2. NOT BEING ABLE TO STOP OR CONTROL WORRYING: NOT AT ALL
IF YOU CHECKED OFF ANY PROBLEMS ON THIS QUESTIONNAIRE, HOW DIFFICULT HAVE THESE PROBLEMS MADE IT FOR YOU TO DO YOUR WORK, TAKE CARE OF THINGS AT HOME, OR GET ALONG WITH OTHER PEOPLE: SOMEWHAT DIFFICULT
GAD7 TOTAL SCORE: 2
1. FEELING NERVOUS, ANXIOUS, OR ON EDGE: SEVERAL DAYS
3. WORRYING TOO MUCH ABOUT DIFFERENT THINGS: NOT AT ALL

## 2018-06-07 NOTE — PROGRESS NOTES
"  SUBJECTIVE:                                                    Morelia Rodriguez is a 32 year old female who presents to clinic today for the following health issues:      Insomnia      Duration: about 4 months    Description  Frequency of insomnia:  Not at all  Time to fall asleep: 30 minutes  Middle of night awakening:  Nightly, but only about 1 time a night  Early morning awakening:  Just with the kids waking up early    Accompanying signs and symptoms:  fatigue    History  Similar episodes in past:  YES  Previous evaluation/sleep study:  no     Precipitating or alleviating factors:  New stressful situation: no   Caffeine intake after lunchtime: no   OTC decongestants: no   Any new medications: no     Therapies tried and outcome: caffeine avoidance and started going to bed earlier.      Hyperlipidemia Follow-Up      Rate your low fat/cholesterol diet?: not monitoring fat    Taking statin?  No    Other lipid medications/supplements?:  none    Nausea      Duration: been a while    Description (location/character/radiation): none    Intensity:  none    Accompanying signs and symptoms: was mostly having in the morning. Has started to eat breakfast. Not having the nausea anymore.    History (similar episodes/previous evaluation): None    Precipitating or alleviating factors: None    Therapies tried and outcome: None        Problem list and histories reviewed & adjusted, as indicated.  Additional history: as documented    Patient Active Problem List   Diagnosis      delivery delivered     Hyperlipidemia with target LDL less than 130     ACP (advance care planning)     Attention deficit hyperactivity disorder (ADHD), combined type     Other insomnia     Gluten-sensitive enteropathy     ROBERT (generalized anxiety disorder)     Nausea     Dysmenorrhea     Past Surgical History:   Procedure Laterality Date      SECTION  2012    Pregnancy-full term \"Shelomoh\" \"Oscar\"      SECTION       "  SECTION  2014    Procedure:  SECTION;  Surgeon: Gaston Norton MD;  Location: [facility] OR     wisdom teeth extraction         Social History   Substance Use Topics     Smoking status: Never Smoker     Smokeless tobacco: Never Used      Comment: no passive exposure     Alcohol use Yes      Comment: rarely 1/year     Family History   Problem Relation Age of Onset     Respiratory Mother      chronic bronchitis yearly     Depression Mother      fatigue     GERD Father      Sleep Disorder Father      Irritable Bowel Syndrome Father      HEART DISEASE Maternal Grandmother      CHF, lung ca??/mass     LUNG DISEASE Maternal Grandfather      +tobacco     Depression Paternal Grandmother      ADHD     Sleep Apnea Paternal Grandmother      insomnia?     Coronary Artery Disease Paternal Grandfather      AMI     Blood Disease Brother      varicose veins     Cardiomyopathy Brother      large heart         Current Outpatient Prescriptions   Medication Sig Dispense Refill     Ascorbic Acid (VITAMIN C PO)        Multiple Vitamins-Minerals (MULTIVITAMIN ADULT PO)        Zinc Acetate, Oral, (ZINC ACETATE PO)        Allergies   Allergen Reactions     Latex Rash     Labs reviewed in EPIC    ROS:  Constitutional, HEENT, cardiovascular, pulmonary, gi and gu systems are negative, except as otherwise noted.    OBJECTIVE:                                                    BP 96/58  Pulse 77  Temp 97.8  F (36.6  C) (Tympanic)  Wt 161 lb (73 kg)  SpO2 99%  BMI 25.99 kg/m2  Body mass index is 25.99 kg/(m^2).  GENERAL APPEARANCE: healthy, alert and no distress  RESP: lungs clear to auscultation - no rales, rhonchi or wheezes  CV: regular rates and rhythm, normal S1 S2, no S3 or S4 and no murmur, click or rub  PSYCH: mentation appears normal and affect normal/bright       ASSESSMENT/PLAN:                                                    1. Hyperlipidemia with target LDL less than 130  Non-fasting labs today  - Zinc Acetate,  Oral, (ZINC ACETATE PO);   - Ascorbic Acid (VITAMIN C PO);   - Multiple Vitamins-Minerals (MULTIVITAMIN ADULT PO);   - Lipid Profile (Chol, Trig, HDL, LDL calc)  - Comprehensive metabolic panel    2. Other insomnia  Just going to bed earlier helped, she has not taken any melatonin    3. ROBERT (generalized anxiety disorder)  Improved with better sleep, she declines other meds    4. Nausea  Resolved also    5. Dysmenorrhea  Improved with sleep and nutrition    She also asked questions before about her sacrum and coccyx on xray as she injured them when she was young, and sits to the side as well to avoid numbness    Patient was agreeable to this plan and had no further questions.  See Patient Instructions    Aliyah Domínguez MD  [facility]

## 2018-06-07 NOTE — MR AVS SNAPSHOT
After Visit Summary   6/7/2018    Morelia Rodriguez    MRN: [id number]           Patient Information     Date Of Birth          1985        Visit Information        Provider Department      6/7/2018 10:30 AM Aliyah Domínguez MD [facility]        Today's Diagnoses     Hyperlipidemia with target LDL less than 130    -  1    Other insomnia        ROBERT (generalized anxiety disorder)        Nausea        Dysmenorrhea           Follow-ups after your visit        Who to contact     If you have questions or need follow up information about today's clinic visit or your schedule please contact [facility] directly at [phone].  Normal or non-critical lab and imaging results will be communicated to you by MyChart, letter or phone within 4 business days after the clinic has received the results. If you do not hear from us within 7 days, please contact the clinic through [employer] or phone. If you have a critical or abnormal lab result, we will notify you by phone as soon as possible.  Submit refill requests through [employer] or call your pharmacy and they will forward the refill request to us. Please allow 3 business days for your refill to be completed.          Additional Information About Your Visit        MyChart Information     [employer] gives you secure access to your electronic health record. If you see a primary care provider, you can also send messages to your care team and make appointments. If you have questions, please call your primary care clinic.  If you do not have a primary care provider, please call [phone] and they will assist you.        Care EveryWhere ID     This is your Care EveryWhere ID. This could be used by other organizations to access your [place] medical records  [id number]        Your Vitals Were     Pulse Temperature Pulse Oximetry BMI (Body Mass Index)          77 97.8  F (36.6  C) (Tympanic) 99% 25.99 kg/m2         Blood Pressure from Last 3  Encounters:   06/07/18 96/58   04/04/18 136/70   04/12/17 117/58    Weight from Last 3 Encounters:   06/07/18 161 lb (73 kg)   04/04/18 163 lb (73.9 kg)   10/07/16 159 lb (72.1 kg)              We Performed the Following     Comprehensive metabolic panel     Lipid Profile (Chol, Trig, HDL, LDL calc)        Primary Care Provider Office Phone # Fax #    Aliyah Domínguez [phone]       [facility] [place] [address]  [place] [address]        Equal Access to Services     [facility] : Hadii aad ku hadasho Soomaali, waaxda luqadaha, qaybta kaalmada adeegyada, vibha cason hayjunior hensley . So [facility] [phone].    ATENCIÓN: Si habla español, tiene a saez disposición servicios gratuitos de asistencia lingüística. Llame al [phone].    We comply with applicable federal civil rights laws and [place] laws. We do not discriminate on the basis of race, color, national origin, age, disability, sex, sexual orientation, or gender identity.            Thank you!     Thank you for choosing [facility]  for your care. Our goal is always to provide you with excellent care. Hearing back from our patients is one way we can continue to improve our services. Please take a few minutes to complete the written survey that you may receive in the mail after your visit with us. Thank you!             Your Updated Medication List - Protect others around you: Learn how to safely use, store and throw away your medicines at www.disposemymeds.org.          This list is accurate as of 6/7/18  1:12 PM.  Always use your most recent med list.                   Brand Name Dispense Instructions for use Diagnosis    MULTIVITAMIN ADULT PO       Hyperlipidemia with target LDL less than 130       VITAMIN C PO       Hyperlipidemia with target LDL less than 130       ZINC ACETATE PO       Hyperlipidemia with target LDL less than 130

## 2018-06-07 NOTE — NURSING NOTE
"Chief Complaint   Patient presents with     Lipids     Insomnia     Nausea       Initial BP 96/58  Pulse 77  Temp 97.8  F (36.6  C) (Tympanic)  Wt 161 lb (73 kg)  SpO2 99%  BMI 25.99 kg/m2 Estimated body mass index is 25.99 kg/(m^2) as calculated from the following:    Height as of 4/12/17: 5' 6\" (1.676 m).    Weight as of this encounter: 161 lb (73 kg).  Medication Reconciliation: complete    Dottie Ly MA    "

## 2018-06-08 ASSESSMENT — PATIENT HEALTH QUESTIONNAIRE - PHQ9: SUM OF ALL RESPONSES TO PHQ QUESTIONS 1-9: 2

## 2018-06-08 ASSESSMENT — ANXIETY QUESTIONNAIRES: GAD7 TOTAL SCORE: 2

## 2018-11-13 ENCOUNTER — HEALTH MAINTENANCE LETTER (OUTPATIENT)
Age: 33
End: 2018-11-13

## 2019-01-17 ENCOUNTER — HOSPITAL ENCOUNTER (EMERGENCY)
Facility: HOSPITAL | Age: 34
Discharge: HOME | End: 2019-01-17
Attending: FAMILY MEDICINE | Admitting: FAMILY MEDICINE
Payer: COMMERCIAL

## 2019-01-17 VITALS
SYSTOLIC BLOOD PRESSURE: 103 MMHG | RESPIRATION RATE: 16 BRPM | OXYGEN SATURATION: 99 % | HEART RATE: 65 BPM | TEMPERATURE: 99 F | DIASTOLIC BLOOD PRESSURE: 61 MMHG

## 2019-01-17 DIAGNOSIS — R00.2 PALPITATIONS: Primary | ICD-10-CM

## 2019-01-17 LAB
ALBUMIN SERPL-MCNC: 4.2 G/DL (ref 3.4–5)
ALBUMIN UR-MCNC: NEGATIVE MG/DL
ALP SERPL-CCNC: 65 U/L (ref 40–150)
ALT SERPL W P-5'-P-CCNC: 21 U/L (ref 0–50)
AMPHETAMINES UR QL SCN: NEGATIVE
ANION GAP SERPL CALCULATED.3IONS-SCNC: 8 MMOL/L (ref 3–14)
APPEARANCE UR: CLEAR
AST SERPL W P-5'-P-CCNC: 13 U/L (ref 0–45)
BACTERIA #/AREA URNS HPF: ABNORMAL /HPF
BARBITURATES UR QL: NEGATIVE
BASOPHILS # BLD AUTO: 0 10E9/L (ref 0–0.2)
BASOPHILS NFR BLD AUTO: 0.4 %
BENZODIAZ UR QL: NEGATIVE
BILIRUB SERPL-MCNC: 0.5 MG/DL (ref 0.2–1.3)
BILIRUB UR QL STRIP: NEGATIVE
BUN SERPL-MCNC: 10 MG/DL (ref 7–30)
CALCIUM SERPL-MCNC: 9.2 MG/DL (ref 8.5–10.1)
CANNABINOIDS UR QL SCN: NEGATIVE
CHLORIDE SERPL-SCNC: 107 MMOL/L (ref 94–109)
CO2 SERPL-SCNC: 26 MMOL/L (ref 20–32)
COCAINE UR QL: NEGATIVE
COLOR UR AUTO: ABNORMAL
CREAT SERPL-MCNC: 0.7 MG/DL (ref 0.52–1.04)
DIFFERENTIAL METHOD BLD: ABNORMAL
EOSINOPHIL # BLD AUTO: 0 10E9/L (ref 0–0.7)
EOSINOPHIL NFR BLD AUTO: 0.7 %
ERYTHROCYTE [DISTWIDTH] IN BLOOD BY AUTOMATED COUNT: 13.2 % (ref 10–15)
GFR SERPL CREATININE-BSD FRML MDRD: >90 ML/MIN/{1.73_M2}
GLUCOSE SERPL-MCNC: 95 MG/DL (ref 70–99)
GLUCOSE UR STRIP-MCNC: NEGATIVE MG/DL
HCT VFR BLD AUTO: 33.9 % (ref 35–47)
HGB BLD-MCNC: 11.5 G/DL (ref 11.7–15.7)
HGB UR QL STRIP: ABNORMAL
HYALINE CASTS #/AREA URNS LPF: 1 /LPF
IMM GRANULOCYTES # BLD: 0 10E9/L (ref 0–0.4)
IMM GRANULOCYTES NFR BLD: 0.2 %
KETONES UR STRIP-MCNC: NEGATIVE MG/DL
LEUKOCYTE ESTERASE UR QL STRIP: NEGATIVE
LYMPHOCYTES # BLD AUTO: 1.1 10E9/L (ref 0.8–5.3)
LYMPHOCYTES NFR BLD AUTO: 24 %
MAGNESIUM SERPL-MCNC: 1.9 MG/DL (ref 1.6–2.3)
MCH RBC QN AUTO: 28 PG (ref 26.5–33)
MCHC RBC AUTO-ENTMCNC: 33.9 G/DL (ref 31.5–36.5)
MCV RBC AUTO: 83 FL (ref 78–100)
METHADONE UR QL SCN: NEGATIVE
MONOCYTES # BLD AUTO: 0.2 10E9/L (ref 0–1.3)
MONOCYTES NFR BLD AUTO: 5.4 %
NEUTROPHILS # BLD AUTO: 3.1 10E9/L (ref 1.6–8.3)
NEUTROPHILS NFR BLD AUTO: 69.3 %
NITRATE UR QL: NEGATIVE
NRBC # BLD AUTO: 0 10*3/UL
NRBC BLD AUTO-RTO: 0 /100
OPIATES UR QL SCN: NEGATIVE
PCP UR QL SCN: NEGATIVE
PH UR STRIP: 7.5 PH (ref 4.7–8)
PLATELET # BLD AUTO: 171 10E9/L (ref 150–450)
POTASSIUM SERPL-SCNC: 3.8 MMOL/L (ref 3.4–5.3)
PROT SERPL-MCNC: 6.9 G/DL (ref 6.8–8.8)
RBC # BLD AUTO: 4.11 10E12/L (ref 3.8–5.2)
RBC #/AREA URNS AUTO: 1 /HPF (ref 0–2)
SODIUM SERPL-SCNC: 141 MMOL/L (ref 133–144)
SOURCE: ABNORMAL
SP GR UR STRIP: 1.01 (ref 1–1.03)
TSH SERPL DL<=0.005 MIU/L-ACNC: 1.34 MU/L (ref 0.4–4)
UROBILINOGEN UR STRIP-MCNC: NORMAL MG/DL (ref 0–2)
WBC # BLD AUTO: 4.5 10E9/L (ref 4–11)
WBC #/AREA URNS AUTO: 0 /HPF (ref 0–5)

## 2019-01-17 PROCEDURE — 84443 ASSAY THYROID STIM HORMONE: CPT | Performed by: FAMILY MEDICINE

## 2019-01-17 PROCEDURE — 99284 EMERGENCY DEPT VISIT MOD MDM: CPT | Mod: Z6 | Performed by: FAMILY MEDICINE

## 2019-01-17 PROCEDURE — 93010 ELECTROCARDIOGRAM REPORT: CPT | Performed by: INTERNAL MEDICINE

## 2019-01-17 PROCEDURE — 99284 EMERGENCY DEPT VISIT MOD MDM: CPT

## 2019-01-17 PROCEDURE — 93005 ELECTROCARDIOGRAM TRACING: CPT

## 2019-01-17 PROCEDURE — 85025 COMPLETE CBC W/AUTO DIFF WBC: CPT | Performed by: FAMILY MEDICINE

## 2019-01-17 PROCEDURE — 83735 ASSAY OF MAGNESIUM: CPT | Performed by: FAMILY MEDICINE

## 2019-01-17 PROCEDURE — 80307 DRUG TEST PRSMV CHEM ANLYZR: CPT | Performed by: EMERGENCY MEDICINE

## 2019-01-17 PROCEDURE — 80053 COMPREHEN METABOLIC PANEL: CPT | Performed by: FAMILY MEDICINE

## 2019-01-17 PROCEDURE — 36415 COLL VENOUS BLD VENIPUNCTURE: CPT | Performed by: FAMILY MEDICINE

## 2019-01-17 PROCEDURE — 81001 URINALYSIS AUTO W/SCOPE: CPT | Mod: 59 | Performed by: EMERGENCY MEDICINE

## 2019-01-17 NOTE — ED AVS SNAPSHOT
[facility]  [address]  [place] [address]  Phone:  [phone]                                    Morelia Rodriguez   MRN: [id number]    Department:  [facility]   Date of Visit:  1/17/2019           After Visit Summary Signature Page    I have received my discharge instructions, and my questions have been answered. I have discussed any challenges I see with this plan with the nurse or doctor.    ..........................................................................................................................................  Patient/Patient Representative Signature      ..........................................................................................................................................  Patient Representative Print Name and Relationship to Patient    ..................................................               ................................................  Date                                   Time    ..........................................................................................................................................  Reviewed by Signature/Title    ...................................................              ..............................................  Date                                               Time          22EPIC Rev 08/18

## 2019-01-18 NOTE — ED PROVIDER NOTES
"eMERGENCY dEPARTMENT eNCOUnter        CHIEF COMPLAINT    Chief Complaint   Patient presents with     Anxiety     chest tightening for under a minute, but continuing off and on through the day. Some SOB, can talk in full sentences. Lightheaded, with feeling like she may faint, palpitations       HPI    Morelia Rodriguez is a 33 year old female who presents with palpitations, feeling like her heart is funny, maybe low on vitamins, some chest tightness earlier but not now.  Hasn't had anything to eat today.  Has a history of iron deficiency, zinc and magnesium deficiency.  No current chest pain.  She waited until her three children were picked up by her dad, then is brought in by her mom.    REVIEW OF SYSTEMS    Cardiac: mildChest Pain, No syncope but feeling lightheaded  Respiratory: No cough or hemoptysis  GI: No Vomiting or Diarrhea  : No Dysuria or Hematuria  General: No Fever or Chills  All other systems reviewed and are negative.    PAST MEDICAL & SURGICAL HISTORY    Past Medical History:   Diagnosis Date     ADHD 2015     Anemia     after 1st pregnancy, hgb 6, ok for auto transfusion or for her mom to donate.     Gluten-sensitive enteropathy 2016     Insomnia 2008     Post partum depression     after 1st pregnancy     Past Surgical History:   Procedure Laterality Date      SECTION  2012    Pregnancy-full term \"Shelomoh\" \"Oscar\"      SECTION        SECTION  2014    Procedure:  SECTION;  Surgeon: Gaston Norton MD;  Location: [facility] OR     wisdom teeth extraction         CURRENT MEDICATIONS    Current Outpatient Rx   Medication Sig Dispense Refill     Ascorbic Acid (VITAMIN C PO)        Multiple Vitamins-Minerals (MULTIVITAMIN ADULT PO)        Zinc Acetate, Oral, (ZINC ACETATE PO)          ALLERGIES    Allergies   Allergen Reactions     Latex Rash       SOCIAL & FAMILY HISTORY    Social History     Socioeconomic History     Marital status:      Spouse name: Not " on file     Number of children: 3     Years of education: 14     Highest education level: Not on file   Social Needs     Financial resource strain: Not on file     Food insecurity - worry: Not on file     Food insecurity - inability: Not on file     Transportation needs - medical: Not on file     Transportation needs - non-medical: Not on file   Occupational History     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     Employer: [employer]     Comment: domestic    Tobacco Use     Smoking status: Never Smoker     Smokeless tobacco: Never Used     Tobacco comment: no passive exposure   Substance and Sexual Activity     Alcohol use: Yes     Comment: rarely 1/year     Drug use: No     Sexual activity: No     Partners: Male   Other Topics Concern      Service No     Blood Transfusions Yes     Comment: would like to discuss     Caffeine Concern No     Occupational Exposure Not Asked     Hobby Hazards Not Asked     Sleep Concern Not Asked     Stress Concern Not Asked     Weight Concern Not Asked     Special Diet No     Back Care Not Asked     Exercise Yes     Comment: DVDs  3-5x/week  30 min     Bike Helmet Not Asked     Seat Belt Yes     Self-Exams No     Parent/sibling w/ CABG, MI or angioplasty before 65F 55M? Not Asked   Social History Narrative     -- n/a    Caffeine -- 1c/day    Exercise -- no     Family History   Problem Relation Age of Onset     Respiratory Mother         chronic bronchitis yearly     Depression Mother         fatigue     GERD Father      Sleep Disorder Father      Irritable Bowel Syndrome Father      Heart Disease Maternal Grandmother         CHF, lung ca??/mass     LUNG DISEASE Maternal Grandfather         +tobacco     Depression Paternal Grandmother         ADHD     Sleep Apnea Paternal Grandmother         insomnia?     Coronary Artery Disease Paternal Grandfather         AMI     Blood Disease Brother         varicose veins     Cardiomyopathy Brother         large heart        PHYSICAL EXAM    VITAL SIGNS: /61   Pulse 65   Temp 99  F (37.2  C) (Oral)   Resp 16   LMP 01/14/2019   SpO2 99%    Constitutional:  Well developed, well nourished, no acute distress   HENT:  Atraumatic, moist mucus membranes  Neck: supple, no JVD   Respiratory:  Lungs Clear, no retractions   Cardiovascular:  regular rate, no murmurs  Vascular: Radial pulses 2+ equal bilaterally  GI:  Soft, nontender, normal bowel sounds  Musculoskeletal:  No edema, no acute deformities  Integument:  Skin warm and dry, no petechiae   Neurologic:  Alert & oriented, no slurred speech  Psych: Pleasant affect, no hallucinations    EKG    Interpreted by emergency department physician:  NSR, no ischemic changes      ED COURSE & MEDICAL DECISION MAKING        See chart for details of medications given during the ED stay.    Vitals:    01/17/19 1950 01/17/19 2000 01/17/19 2020 01/17/19 2101   BP:  114/64 110/59 103/61   Pulse:    65   Resp:    16   Temp:    99  F (37.2  C)   TempSrc:    Oral   SpO2: 100% 100% 98% 99%         FINAL IMPRESSION    1. Palpitations        Plan: I am going off service, labs have been ordered, patient signed out to Dr. Mendez, I suspect this is more anxiety related.         Sheri Lara MD  01/18/19 4745

## 2019-01-18 NOTE — ED PROVIDER NOTES
Patient handed over from Dr. Lara at shift change at 8 PM, please see her notes.  Diagnosis: Palpitations  Patient presented to the ED with history of palpitations, initially evaluated by Dr. Lara at the ED and test done showed normal CBC, CMP, magnesium, urinalysis and negative urine drug screen.  Patient symptoms had resolved by the time she arrived in the ED.  Most likely this is anxiety.  Patient reassured and discussed laboratory test with the patient.  Discharged home in stable condition.  Written and verbal discharge instructions given.  Return to ED if condition worsens tonight otherwise follow-up with PCP as outpatient.     Juan Mendez MD  01/17/19 3589

## 2020-03-02 ENCOUNTER — HEALTH MAINTENANCE LETTER (OUTPATIENT)
Age: 35
End: 2020-03-02

## 2020-12-14 ENCOUNTER — HEALTH MAINTENANCE LETTER (OUTPATIENT)
Age: 35
End: 2020-12-14

## 2021-04-18 ENCOUNTER — HEALTH MAINTENANCE LETTER (OUTPATIENT)
Age: 36
End: 2021-04-18

## 2021-09-16 ENCOUNTER — OFFICE VISIT (OUTPATIENT)
Dept: FAMILY MEDICINE | Facility: OTHER | Age: 36
End: 2021-09-16
Attending: FAMILY MEDICINE
Payer: COMMERCIAL

## 2021-09-16 ENCOUNTER — NURSE TRIAGE (OUTPATIENT)
Dept: FAMILY MEDICINE | Facility: OTHER | Age: 36
End: 2021-09-16

## 2021-09-16 DIAGNOSIS — Z20.822 SUSPECTED COVID-19 VIRUS INFECTION: ICD-10-CM

## 2021-09-16 DIAGNOSIS — Z20.822 SUSPECTED COVID-19 VIRUS INFECTION: Primary | ICD-10-CM

## 2021-09-16 PROCEDURE — U0005 INFEC AGEN DETEC AMPLI PROBE: HCPCS | Mod: ZL

## 2021-09-16 NOTE — TELEPHONE ENCOUNTER
"    Reason for Disposition    [1] COVID-19 infection suspected by caller or triager AND [2] mild symptoms (cough, fever, or others) AND [3] no complications or SOB    Answer Assessment - Initial Assessment Questions  1. COVID-19 DIAGNOSIS: \"Who made your Coronavirus (COVID-19) diagnosis?\" \"Was it confirmed by a positive lab test?\" If not diagnosed by a HCP, ask \"Are there lots of cases (community spread) where you live?\" (See public health department website, if unsure)      no  2. COVID-19 EXPOSURE: \"Was there any known exposure to COVID before the symptoms began?\" CDC Definition of close contact: within 6 feet (2 meters) for a total of 15 minutes or more over a 24-hour period.       no  3. ONSET: \"When did the COVID-19 symptoms start?\"       sunday  4. WORST SYMPTOM: \"What is your worst symptom?\" (e.g., cough, fever, shortness of breath, muscle aches)      Cough fever sob body aches  5. COUGH: \"Do you have a cough?\" If so, ask: \"How bad is the cough?\"        yes  6. FEVER: \"Do you have a fever?\" If so, ask: \"What is your temperature, how was it measured, and when did it start?\"      yes  7. RESPIRATORY STATUS: \"Describe your breathing?\" (e.g., shortness of breath, wheezing, unable to speak)       no  8. BETTER-SAME-WORSE: \"Are you getting better, staying the same or getting worse compared to yesterday?\"  If getting worse, ask, \"In what way?\"      same  9. HIGH RISK DISEASE: \"Do you have any chronic medical problems?\" (e.g., asthma, heart or lung disease, weak immune system, obesity, etc.)      no  10. PREGNANCY: \"Is there any chance you are pregnant?\" \"When was your last menstrual period?\"        no  11. OTHER SYMPTOMS: \"Do you have any other symptoms?\"  (e.g., chills, fatigue, headache, loss of smell or taste, muscle pain, sore throat; new loss of smell or taste especially support the diagnosis of COVID-19)        Chills fatigue loss of taste and smell headache    Protocols used: CORONAVIRUS (COVID-19) DIAGNOSED " OR [id number] 3.25

## 2021-09-17 ENCOUNTER — TELEPHONE (OUTPATIENT)
Dept: FAMILY MEDICINE | Facility: OTHER | Age: 36
End: 2021-09-17

## 2021-09-17 LAB — SARS-COV-2 RNA RESP QL NAA+PROBE: POSITIVE

## 2021-09-17 NOTE — TELEPHONE ENCOUNTER
"-Coronavirus (COVID-19) Notification    Caller Name (Patient, parent, daughter/son, grandparent, etc)  Patient     Reason for call  Notify of Positive Coronavirus (COVID-19) lab results, assess symptoms,  review  [employer] recommendations    Lab Result    Lab test:  2019-nCoV rRt-PCR or SARS-CoV-2 PCR    Oropharyngeal AND/OR nasopharyngeal swabs is POSITIVE for 2019-nCoV RNA/SARS-COV-2 PCR (COVID-19 virus)    RN Recommendations/Instructions per [facility] Coronavirus COVID-19 recommendations    Brief introduction script  Introduce self then review script:  \"I am calling on behalf of [employer].  We were notified that your Coronavirus test (COVID-19) for was POSITIVE for the virus.  I have some information to relay to you but first I wanted to mention that the [place] Dept of Health will be contacting you shortly [it's possible MD already called Patient] to talk to you more about how you are feeling and other people you have had contact with who might now also have the virus.  Also,  [employer] is Partnering with the [facility] for Covid-19 research, you may be contacted directly by research staff.\"    Assessment (Inquire about Patient's current symptoms)   Assessment   Current Symptoms at time of phone call: (if no symptoms, document No symptoms] Cough, general muscle body weakness and headache   Symptoms onset (if applicable) 5 days ago     If at time of call, Patients symptoms hare worsened, the Patient should contact 911 or have someone drive them to Emergency Dept promptly:      If Patient calling 911, inform 911 personal that you have tested positive for the Coronavirus (COVID-19).  Place mask on and await 911 to arrive.    If Emergency Dept, If possible, please have another adult drive you to the Emergency Dept but you need to wear mask when in contact with other people.      Monoclonal Antibody Administration    You may be eligible to receive a new treatment with a monoclonal " "antibody for preventing hospitalization in patients at high risk for complications from COVID-19.   This medication is still experimental and available on a limited basis; it is given through an IV and must be given at an infusion center. Please note that not all people who are eligible will receive the medication since it is in limited supply.     Are you interested in being considered for this medication?  No.   Does the patient fit the criteria: No    If patient qualifies based on above criteria:  \"You will be contacted if you are selected to receive this treatment in the next 1-2 business days.   This is time sensitive and if you are not selected in the next 1-2 business days, you will not receive the medication.  If you do not receive a call to schedule, you have not been selected.\"      Review information with Patient    Your result was positive. This means you have COVID-19 (coronavirus).  We have sent you a letter that reviews the information that I'll be reviewing with you now.    How can I protect others?    If you have symptoms: stay home and away from others (self-isolate) until:    You've had no fever--and no medicine that reduces fever--for 1 full day (24 hours). And       Your other symptoms have gotten better. For example, your cough or breathing has improved. And     At least 10 days have passed since your symptoms started. (If you've been told by a doctor that you have a weak immune system, wait 20 days.)     If you don't have symptoms: Stay home and away from others (self-isolate) until at least 10 days have passed since your first positive COVID-19 test. (Date test collected)    During this time:    Stay in your own room, including for meals. Use your own bathroom if you can.    Stay away from others in your home. No hugging, kissing or shaking hands. No visitors.     Don't go to work, school or anywhere else.     Clean  high touch  surfaces often (doorknobs, counters, handles, etc.). Use a " household cleaning spray or wipes. You'll find a full list on the EPA website at www.epa.gov/pesticide-registration/list-n-disinfectants-use-against-sars-cov-2.     Cover your mouth and nose with a mask, tissue or other face covering to avoid spreading germs.    Wash your hands and face often with soap and water.    Make a list of people you have been in close contact with recently, even if either of you wore a face covering.   ; Start your list from 2 days before you became ill or had a positive test.  ; Include anyone that was within 6 feet of you for a cumulative total of 15 minutes or more in 24 hours. (Example: if you sat next to Maynor for 5 minutes in the morning and 10 minutes in the afternoon, then you were in close contact for 15 minutes total that day. Maynor would be added to your list.)    A public health worker will call or text you. It is important that you answer. They will ask you questions about possible exposures to COVID-19, such as people you have been in direct contact with and places you have visited.    Tell the people on your list that you have COVID-19; they should stay away from others for 14 days starting from the last time they were in contact with you (unless you are told something different from a public health worker).     Caregivers in these groups are at risk for severe illness due to COVID-19:  o People 65 years and older  o People who live in a nursing home or long-term care facility  o People with chronic disease (lung, heart, cancer, diabetes, kidney, liver, immunologic)  o People who have a weakened immune system, including those who:  - Are in cancer treatment  - Take medicine that weakens the immune system, such as corticosteroids  - Had a bone marrow or organ transplant  - Have an immune deficiency  - Have poorly controlled HIV or AIDS  - Are obese (body mass index of 40 or higher)  - Smoke regularly    Caregivers should wear gloves while washing dishes, handling laundry and  cleaning bedrooms and bathrooms.    Wash and dry laundry with special caution. Don't shake dirty laundry, and use the warmest water setting you can.    If you have a weakened immune system, ask your doctor about other actions you should take.    For more tips, go to www.cdc.gov/coronavirus/2019-ncov/downloads/10Things.pdf.    You should not go back to work until you meet the guidelines above for ending your home isolation. You don't need to be retested for COVID-19 before going back to work--studies show that you won't spread the virus if it's been at least 10 days since your symptoms started (or 20 days, if you have a weak immune system).    Employers: This document serves as formal notice of your employee's medical guidelines for going back to work. They must meet the above guidelines before going back to work in person.    How can I take care of myself?    1. Get lots of rest. Drink extra fluids (unless a doctor has told you not to).    2. Take Tylenol (acetaminophen) for fever or pain. If you have liver or kidney problems, ask your family doctor if it's okay to take Tylenol.     Take either:     650 mg (two 325 mg pills) every 4 to 6 hours, or     1,000 mg (two 500 mg pills) every 8 hours as needed.     Note: Don't take more than 3,000 mg in one day. Acetaminophen is found in many medicines (both prescribed and over-the-counter medicines). Read all labels to be sure you don't take too much.    For children, check the Tylenol bottle for the right dose (based on their age or weight).    3. If you have other health problems (like cancer, heart failure, an organ transplant or severe kidney disease): Call your specialty clinic if you don't feel better in the next 2 days.    4. Know when to call 911: Emergency warning signs include:    Trouble breathing or shortness of breath    Pain or pressure in the chest that doesn't go away    Feeling confused like you haven't felt before, or not being able to wake  up    Bluish-colored lips or face    5. Sign up for [employer]. We know it's scary to hear that you have COVID-19. We want to track your symptoms to make sure you're okay over the next 2 weeks. Please look for an email from [employer]--this is a free, online program that we'll use to keep in touch. To sign up, follow the link in the email. Learn more at [email or web address].    Where can I get more information?    [facility]: [email or web address]    Coronavirus Basics: www.health.[facility].[place]./diseases/coronavirus/basics.html    What to Do If You're Sick: www.cdc.gov/coronavirus/2019-ncov/about/steps-when-sick.html    Ending Home Isolation: www.cdc.gov/coronavirus/2019-ncov/hcp/disposition-in-home-patients.html     Caring for Someone with COVID-19: www.cdc.gov/coronavirus/2019-ncov/if-you-are-sick/care-for-someone.html     [facility] clinical trials (COVID-19 research studies): clinicalaffairs.[facility]-clinical-trials     A Positive COVID-19 letter will be sent via [employer] or the mail. (Exception, no letters sent to Presurgerical/Preprocedure Patients)    Tierra Mckeon LPN

## 2021-09-26 NOTE — PROGRESS NOTES
"    Assessment & Plan     Hyperlipidemia LDL goal <130  Will come for fasting labs  - Comprehensive metabolic panel; Future  - Lipid Profile (Chol, Trig, HDL, LDL calc); Future    Dental anomaly  Discussed her dental coverage    ROBERT (generalized anxiety disorder)  Worse lately    Family history of first-degree relative with cardiomyopathy    - Echocardiogram Complete; Future           BMI:   Estimated body mass index is 30.51 kg/m  as calculated from the following:    Height as of this encounter: 1.676 m (5' 6\").    Weight as of this encounter: 85.7 kg (189 lb).   Weight management plan: Discussed healthy diet and exercise guidelines    Patient was agreeable to this plan and had no further questions.  Patient Instructions    Psychologists/ Counselors      Polo Snyder   [phone]  [facility]   [phone]  [facility] [phone]  [employer] (kids) [phone]  [employer](teens) [phone]  [facility]  [phone]  [facility]  [phone]  [facility]  [phone]  [facility]  [phone]  [facility] counseling [phone]   [facility]   [phone]   [facility]    [phone]   [facility]ing [facility].   [phone]   (Esme Carrasco)      [facility] [phone]       [place] [employer] [facility]  [phone]  [facility]      [phone]     [place]  [place]   [phone]    *[facility]  [phone]      [facility] Dr. YOSI Fenton [phone]    *[facility]      [phone]      [facility]  [phone]    [facility]                      [phone]    *Facilities in bold italics indicate medication management  Services are offered.    Crisis support  Mobile crisis line- [phone]  [facility]: Free online resources including therapy for Mental Health and substance use problems: Http://thriverange.org    Crisis Text Line  Text HOME " to 131-893 - The Crisis Text Line serves anyone, in any type of crisis, providing access to free, 24/7 support and information via the medium people already use and trust  http://www.crisistextline.org   Here's how it works:  Text HOME to 661-333 from anywhere in the [place], anytime, about any type of crisis.  A live, trained Crisis Counselor receives the text and responds quickly.  The volunteer Crisis Counselor will help you move from a 'hot moment to a cool moment'                                No follow-ups on file.    Aliyah Domínguez MD  [facility] - SHANTEL Thibodeaux is a 36 year old who presents for the following health issues     HPI     Hyperlipidemia Follow-Up      Are you regularly taking any medication or supplement to lower your cholesterol?   No    Are you having muscle aches or other side effects that you think could be caused by your cholesterol lowering medication?  No    Anxiety Follow-Up    How are you doing with your anxiety since your last visit? Improved     Are you having other symptoms that might be associated with anxiety? No    Have you had a significant life event? No     Are you feeling depressed? No    Do you have any concerns with your use of alcohol or other drugs? No    Social History     Tobacco Use     Smoking status: Never Smoker     Smokeless tobacco: Never Used     Tobacco comment: no passive exposure   Substance Use Topics     Alcohol use: Yes     Comment: rarely 1/year     Drug use: No     ROBERT-7 SCORE 4/4/2018 6/7/2018   Total Score 7 2     PHQ 10/7/2016 4/4/2018 6/7/2018   PHQ-9 Total Score 2 6 2   Q9: Thoughts of better off dead/self-harm past 2 weeks Not at all Not at all Not at all     Last PHQ-9 10/13/2021   1.  Little interest or pleasure in doing things 0   2.  Feeling down, depressed, or hopeless 0   3.  Trouble falling or staying asleep, or sleeping too much 2   4.  Feeling tired or having little energy 1   5.  Poor appetite or overeating 0  "  6.  Feeling bad about yourself 0   7.  Trouble concentrating 0   8.  Moving slowly or restless 0   Q9: Thoughts of better off dead/self-harm past 2 weeks 0   PHQ-9 Total Score 3   Difficulty at work, home, or with people Not difficult at all     ROBERT-7  10/13/2021   1. Feeling nervous, anxious, or on edge 1   2. Not being able to stop or control worrying 0   3. Worrying too much about different things 0   4. Trouble relaxing 0   5. Being so restless that it is hard to sit still 0   6. Becoming easily annoyed or irritable 1   7. Feeling afraid, as if something awful might happen 0   ROBERT-7 Total Score 2   If you checked any problems, how difficult have they made it for you to do your work, take care of things at home, or get along with other people? Not difficult at all         How many servings of fruits and vegetables do you eat daily?  2-3    On average, how many sweetened beverages do you drink each day (Examples: soda, juice, sweet tea, etc.  Do NOT count diet or artificially sweetened beverages)?   1    How many days per week do you exercise enough to make your heart beat faster? 3 or less    How many minutes a day do you exercise enough to make your heart beat faster? 9 or less    How many days per week do you miss taking your medication? 0      Review of Systems   Constitutional, HEENT, cardiovascular, pulmonary, gi and gu systems are negative, except as otherwise noted.      Objective    BP 98/56 (BP Location: Right arm, Patient Position: Sitting, Cuff Size: Adult Large)   Pulse 88   Temp 99  F (37.2  C) (Tympanic)   Resp 16   Ht 1.676 m (5' 6\")   Wt 85.7 kg (189 lb)   SpO2 100%   BMI 30.51 kg/m    There is no height or weight on file to calculate BMI.  Physical Exam   GENERAL: healthy, alert and no distress  NECK: no adenopathy, no asymmetry, masses, or scars and thyroid normal to palpation  RESP: lungs clear to auscultation - no rales, rhonchi or wheezes  CV: regular rate and rhythm, normal S1 S2, " no S3 or S4, no murmur, click or rub, no peripheral edema and peripheral pulses strong  ABDOMEN: soft, nontender, no hepatosplenomegaly, no masses and bowel sounds normal  MS: no gross musculoskeletal defects noted, no edema  PSYCH: mentation appears normal, affect normal/bright    No results found for any visits on 10/13/21.

## 2021-10-02 ENCOUNTER — HEALTH MAINTENANCE LETTER (OUTPATIENT)
Age: 36
End: 2021-10-02

## 2021-10-13 ENCOUNTER — OFFICE VISIT (OUTPATIENT)
Dept: FAMILY MEDICINE | Facility: OTHER | Age: 36
End: 2021-10-13
Attending: FAMILY MEDICINE
Payer: COMMERCIAL

## 2021-10-13 VITALS
HEART RATE: 88 BPM | WEIGHT: 189 LBS | BODY MASS INDEX: 30.37 KG/M2 | DIASTOLIC BLOOD PRESSURE: 56 MMHG | HEIGHT: 66 IN | OXYGEN SATURATION: 100 % | RESPIRATION RATE: 16 BRPM | SYSTOLIC BLOOD PRESSURE: 98 MMHG | TEMPERATURE: 99 F

## 2021-10-13 DIAGNOSIS — Z82.49 FAMILY HISTORY OF FIRST-DEGREE RELATIVE WITH CARDIOMYOPATHY: ICD-10-CM

## 2021-10-13 DIAGNOSIS — K00.9 DENTAL ANOMALY: ICD-10-CM

## 2021-10-13 DIAGNOSIS — F41.1 GAD (GENERALIZED ANXIETY DISORDER): ICD-10-CM

## 2021-10-13 DIAGNOSIS — E78.5 HYPERLIPIDEMIA LDL GOAL <130: Primary | ICD-10-CM

## 2021-10-13 PROCEDURE — 99214 OFFICE O/P EST MOD 30 MIN: CPT | Performed by: FAMILY MEDICINE

## 2021-10-13 PROCEDURE — G0463 HOSPITAL OUTPT CLINIC VISIT: HCPCS | Mod: 25

## 2021-10-13 PROCEDURE — G0463 HOSPITAL OUTPT CLINIC VISIT: HCPCS

## 2021-10-13 RX ORDER — LACTOBACILLUS RHAMNOSUS GG 10B CELL
1 CAPSULE ORAL PRN
COMMUNITY
End: 2024-08-14

## 2021-10-13 ASSESSMENT — ANXIETY QUESTIONNAIRES
6. BECOMING EASILY ANNOYED OR IRRITABLE: SEVERAL DAYS
GAD7 TOTAL SCORE: 2
2. NOT BEING ABLE TO STOP OR CONTROL WORRYING: NOT AT ALL
5. BEING SO RESTLESS THAT IT IS HARD TO SIT STILL: NOT AT ALL
4. TROUBLE RELAXING: NOT AT ALL
7. FEELING AFRAID AS IF SOMETHING AWFUL MIGHT HAPPEN: NOT AT ALL
1. FEELING NERVOUS, ANXIOUS, OR ON EDGE: SEVERAL DAYS
3. WORRYING TOO MUCH ABOUT DIFFERENT THINGS: NOT AT ALL
IF YOU CHECKED OFF ANY PROBLEMS ON THIS QUESTIONNAIRE, HOW DIFFICULT HAVE THESE PROBLEMS MADE IT FOR YOU TO DO YOUR WORK, TAKE CARE OF THINGS AT HOME, OR GET ALONG WITH OTHER PEOPLE: NOT DIFFICULT AT ALL

## 2021-10-13 ASSESSMENT — PAIN SCALES - GENERAL: PAINLEVEL: NO PAIN (0)

## 2021-10-13 ASSESSMENT — MIFFLIN-ST. JEOR: SCORE: 1574.05

## 2021-10-13 ASSESSMENT — PATIENT HEALTH QUESTIONNAIRE - PHQ9: SUM OF ALL RESPONSES TO PHQ QUESTIONS 1-9: 3

## 2021-10-13 NOTE — NURSING NOTE
"Chief Complaint   Patient presents with     Lipids     Anxiety       Initial BP 98/56 (BP Location: Right arm, Patient Position: Sitting, Cuff Size: Adult Large)   Pulse 88   Temp 99  F (37.2  C) (Tympanic)   Resp 16   Ht 1.676 m (5' 6\")   Wt 85.7 kg (189 lb)   SpO2 100%   BMI 30.51 kg/m   Estimated body mass index is 30.51 kg/m  as calculated from the following:    Height as of this encounter: 1.676 m (5' 6\").    Weight as of this encounter: 85.7 kg (189 lb).  Medication Reconciliation: complete  Michell Rodríguez LPN  "

## 2021-10-13 NOTE — PATIENT INSTRUCTIONS
Psychologists/ Counselors      Polo Snyder   [phone]  [facility]   [phone]  [facility] [phone]  [employer] (kids) [phone]  [employer](teens) [phone]  [facility]  [phone]  [facility]  [phone]  [facility]  [phone]  [facility]  [phone]  [facility] counseling [phone]   [employer]   [phone]   [facility]    [phone]   [facility]   [phone]   (Esme Carrasco)      [facility] [phone]       [facility]  [phone]  [facility]      [phone]     [place]  [place]   [phone]    *[employer]  [phone]      [facility] Dr[address] YOSI Fenton [phone]    *[facility]      [phone]      [facility]  [phone]    [facility]                      [phone]    *Facilities in bold italics indicate medication management  Services are offered.    Crisis support  Mobile crisis line- [phone]  [facility]: Free online resources including therapy for Mental Health and substance use problems: Http://thriverange.org    Crisis Text Line  Text HOME to 053-050 - The Crisis Text Line serves anyone, in any type of crisis, providing access to free, 24/7 support and information via the medium people already use and trust  http://www.crisistextline.org   Here's how it works:  Text HOME to 970-569 from anywhere in the [place], anytime, about any type of crisis.  A live, trained Crisis Counselor receives the text and responds quickly.  The volunteer Crisis Counselor will help you move from a 'hot moment to a cool moment'

## 2021-10-14 ASSESSMENT — ANXIETY QUESTIONNAIRES: GAD7 TOTAL SCORE: 2

## 2021-10-25 ENCOUNTER — LAB (OUTPATIENT)
Dept: LAB | Facility: OTHER | Age: 36
End: 2021-10-25
Payer: COMMERCIAL

## 2021-10-25 DIAGNOSIS — E78.5 HYPERLIPIDEMIA LDL GOAL <130: ICD-10-CM

## 2021-10-25 LAB
ALBUMIN SERPL-MCNC: 3.9 G/DL (ref 3.4–5)
ALP SERPL-CCNC: 66 U/L (ref 40–150)
ALT SERPL W P-5'-P-CCNC: 32 U/L (ref 0–50)
ANION GAP SERPL CALCULATED.3IONS-SCNC: 1 MMOL/L (ref 3–14)
AST SERPL W P-5'-P-CCNC: 18 U/L (ref 0–45)
BILIRUB SERPL-MCNC: 0.7 MG/DL (ref 0.2–1.3)
BUN SERPL-MCNC: 10 MG/DL (ref 7–30)
CALCIUM SERPL-MCNC: 8.5 MG/DL (ref 8.5–10.1)
CHLORIDE BLD-SCNC: 110 MMOL/L (ref 94–109)
CHOLEST SERPL-MCNC: 126 MG/DL
CO2 SERPL-SCNC: 28 MMOL/L (ref 20–32)
CREAT SERPL-MCNC: 0.72 MG/DL (ref 0.52–1.04)
FASTING STATUS PATIENT QL REPORTED: YES
GFR SERPL CREATININE-BSD FRML MDRD: >90 ML/MIN/1.73M2
GLUCOSE BLD-MCNC: 81 MG/DL (ref 70–99)
HDLC SERPL-MCNC: 44 MG/DL
LDLC SERPL CALC-MCNC: 71 MG/DL
NONHDLC SERPL-MCNC: 82 MG/DL
POTASSIUM BLD-SCNC: 3.9 MMOL/L (ref 3.4–5.3)
PROT SERPL-MCNC: 7.1 G/DL (ref 6.8–8.8)
SODIUM SERPL-SCNC: 139 MMOL/L (ref 133–144)
TRIGL SERPL-MCNC: 55 MG/DL

## 2021-10-25 PROCEDURE — 36415 COLL VENOUS BLD VENIPUNCTURE: CPT | Mod: ZL

## 2021-10-25 PROCEDURE — 82040 ASSAY OF SERUM ALBUMIN: CPT | Mod: ZL

## 2021-10-25 PROCEDURE — 83718 ASSAY OF LIPOPROTEIN: CPT | Mod: ZL

## 2021-10-25 PROCEDURE — 82374 ASSAY BLOOD CARBON DIOXIDE: CPT | Mod: ZL

## 2021-10-29 ENCOUNTER — HOSPITAL ENCOUNTER (OUTPATIENT)
Dept: CARDIOLOGY | Facility: HOSPITAL | Age: 36
Discharge: HOME | End: 2021-10-29
Attending: FAMILY MEDICINE | Admitting: INTERNAL MEDICINE
Payer: COMMERCIAL

## 2021-10-29 DIAGNOSIS — Z82.49 FAMILY HISTORY OF FIRST-DEGREE RELATIVE WITH CARDIOMYOPATHY: ICD-10-CM

## 2021-10-29 LAB — LVEF ECHO: NORMAL

## 2021-10-29 PROCEDURE — 93306 TTE W/DOPPLER COMPLETE: CPT | Mod: 26 | Performed by: INTERNAL MEDICINE

## 2021-10-29 PROCEDURE — 93306 TTE W/DOPPLER COMPLETE: CPT

## 2021-11-08 ENCOUNTER — OFFICE VISIT (OUTPATIENT)
Dept: FAMILY MEDICINE | Facility: OTHER | Age: 36
End: 2021-11-08
Attending: FAMILY MEDICINE
Payer: COMMERCIAL

## 2021-11-08 VITALS
HEART RATE: 69 BPM | RESPIRATION RATE: 18 BRPM | BODY MASS INDEX: 30.34 KG/M2 | DIASTOLIC BLOOD PRESSURE: 64 MMHG | TEMPERATURE: 97.3 F | SYSTOLIC BLOOD PRESSURE: 106 MMHG | WEIGHT: 188 LBS | OXYGEN SATURATION: 98 %

## 2021-11-08 DIAGNOSIS — F90.2 ATTENTION DEFICIT HYPERACTIVITY DISORDER (ADHD), COMBINED TYPE: ICD-10-CM

## 2021-11-08 DIAGNOSIS — N89.8 VAGINAL DISCHARGE: Primary | ICD-10-CM

## 2021-11-08 DIAGNOSIS — Z82.49 FAMILY HISTORY OF FIRST-DEGREE RELATIVE WITH CARDIOMYOPATHY: ICD-10-CM

## 2021-11-08 DIAGNOSIS — Z00.00 ROUTINE GENERAL MEDICAL EXAMINATION AT A HEALTH CARE FACILITY: ICD-10-CM

## 2021-11-08 DIAGNOSIS — F41.1 GAD (GENERALIZED ANXIETY DISORDER): ICD-10-CM

## 2021-11-08 DIAGNOSIS — N94.3 PREMENSTRUAL SYNDROME: ICD-10-CM

## 2021-11-08 DIAGNOSIS — Z11.51 SCREENING FOR HUMAN PAPILLOMAVIRUS: ICD-10-CM

## 2021-11-08 PROCEDURE — 99395 PREV VISIT EST AGE 18-39: CPT | Performed by: FAMILY MEDICINE

## 2021-11-08 PROCEDURE — G0145 SCR C/V CYTO,THINLAYER,RESCR: HCPCS | Mod: ZL | Performed by: FAMILY MEDICINE

## 2021-11-08 PROCEDURE — 87624 HPV HI-RISK TYP POOLED RSLT: CPT | Mod: ZL | Performed by: FAMILY MEDICINE

## 2021-11-08 SDOH — HEALTH STABILITY: PHYSICAL HEALTH: ON AVERAGE, HOW MANY MINUTES DO YOU ENGAGE IN EXERCISE AT THIS LEVEL?: 0 MIN

## 2021-11-08 SDOH — HEALTH STABILITY: PHYSICAL HEALTH: ON AVERAGE, HOW MANY DAYS PER WEEK DO YOU ENGAGE IN MODERATE TO STRENUOUS EXERCISE (LIKE A BRISK WALK)?: 0 DAYS

## 2021-11-08 ASSESSMENT — ANXIETY QUESTIONNAIRES
IF YOU CHECKED OFF ANY PROBLEMS ON THIS QUESTIONNAIRE, HOW DIFFICULT HAVE THESE PROBLEMS MADE IT FOR YOU TO DO YOUR WORK, TAKE CARE OF THINGS AT HOME, OR GET ALONG WITH OTHER PEOPLE: NOT DIFFICULT AT ALL
3. WORRYING TOO MUCH ABOUT DIFFERENT THINGS: NOT AT ALL
4. TROUBLE RELAXING: SEVERAL DAYS
5. BEING SO RESTLESS THAT IT IS HARD TO SIT STILL: NOT AT ALL
6. BECOMING EASILY ANNOYED OR IRRITABLE: SEVERAL DAYS
1. FEELING NERVOUS, ANXIOUS, OR ON EDGE: SEVERAL DAYS
GAD7 TOTAL SCORE: 3
7. FEELING AFRAID AS IF SOMETHING AWFUL MIGHT HAPPEN: NOT AT ALL
2. NOT BEING ABLE TO STOP OR CONTROL WORRYING: NOT AT ALL

## 2021-11-08 ASSESSMENT — LIFESTYLE VARIABLES
HOW OFTEN DO YOU HAVE A DRINK CONTAINING ALCOHOL: MONTHLY OR LESS
HOW OFTEN DO YOU HAVE SIX OR MORE DRINKS ON ONE OCCASION: NEVER
HOW MANY STANDARD DRINKS CONTAINING ALCOHOL DO YOU HAVE ON A TYPICAL DAY: 1 OR 2

## 2021-11-08 NOTE — PATIENT INSTRUCTIONS
1.  optivite PMT 3 tabs 2x/day  2.  Check with insurance about coverage at [facility]
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
## 2021-11-08 NOTE — NURSING NOTE
"Chief Complaint   Patient presents with     Physical       Initial /64 (BP Location: Right arm, Patient Position: Sitting, Cuff Size: Adult Regular)   Pulse 69   Temp 97.3  F (36.3  C) (Tympanic)   Resp 18   Wt 85.3 kg (188 lb)   LMP 11/01/2021   SpO2 98%   BMI 30.34 kg/m   Estimated body mass index is 30.34 kg/m  as calculated from the following:    Height as of 10/13/21: 1.676 m (5' 6\").    Weight as of this encounter: 85.3 kg (188 lb).  Medication Reconciliation: complete  Praneeth Silva LPN  "

## 2021-11-09 ASSESSMENT — ANXIETY QUESTIONNAIRES: GAD7 TOTAL SCORE: 3

## 2021-11-09 ASSESSMENT — PATIENT HEALTH QUESTIONNAIRE - PHQ9: SUM OF ALL RESPONSES TO PHQ QUESTIONS 1-9: 4

## 2021-11-10 LAB
BKR LAB AP GYN ADEQUACY: NORMAL
BKR LAB AP GYN INTERPRETATION: NORMAL
BKR LAB AP HPV REFLEX: NORMAL
BKR LAB AP LMP: NORMAL
BKR LAB AP PREVIOUS ABNORMAL: NORMAL
PATH REPORT.COMMENTS IMP SPEC: NORMAL
PATH REPORT.COMMENTS IMP SPEC: NORMAL
PATH REPORT.RELEVANT HX SPEC: NORMAL

## 2021-11-12 LAB
HUMAN PAPILLOMA VIRUS 16 DNA: NEGATIVE
HUMAN PAPILLOMA VIRUS 18 DNA: NEGATIVE
HUMAN PAPILLOMA VIRUS FINAL DIAGNOSIS: NORMAL
HUMAN PAPILLOMA VIRUS OTHER HR: NEGATIVE

## 2022-05-14 ENCOUNTER — HEALTH MAINTENANCE LETTER (OUTPATIENT)
Age: 37
End: 2022-05-14

## 2022-09-03 ENCOUNTER — HEALTH MAINTENANCE LETTER (OUTPATIENT)
Age: 37
End: 2022-09-03

## 2023-05-12 ENCOUNTER — OFFICE VISIT (OUTPATIENT)
Dept: FAMILY MEDICINE | Facility: OTHER | Age: 38
End: 2023-05-12
Attending: STUDENT IN AN ORGANIZED HEALTH CARE EDUCATION/TRAINING PROGRAM
Payer: COMMERCIAL

## 2023-05-12 ENCOUNTER — ANCILLARY PROCEDURE (OUTPATIENT)
Dept: GENERAL RADIOLOGY | Facility: OTHER | Age: 38
End: 2023-05-12
Attending: STUDENT IN AN ORGANIZED HEALTH CARE EDUCATION/TRAINING PROGRAM
Payer: COMMERCIAL

## 2023-05-12 VITALS
TEMPERATURE: 98.2 F | SYSTOLIC BLOOD PRESSURE: 95 MMHG | HEIGHT: 66 IN | OXYGEN SATURATION: 97 % | DIASTOLIC BLOOD PRESSURE: 65 MMHG | BODY MASS INDEX: 29.67 KG/M2 | WEIGHT: 184.6 LBS | HEART RATE: 73 BPM

## 2023-05-12 DIAGNOSIS — S16.1XXA STRAIN OF NECK MUSCLE, INITIAL ENCOUNTER: ICD-10-CM

## 2023-05-12 DIAGNOSIS — M54.2 CERVICAL PAIN: ICD-10-CM

## 2023-05-12 DIAGNOSIS — S16.1XXA STRAIN OF NECK MUSCLE, INITIAL ENCOUNTER: Primary | ICD-10-CM

## 2023-05-12 PROBLEM — F90.2 ATTENTION DEFICIT HYPERACTIVITY DISORDER (ADHD), COMBINED TYPE: Chronic | Status: ACTIVE | Noted: 2018-04-04

## 2023-05-12 PROCEDURE — 72040 X-RAY EXAM NECK SPINE 2-3 VW: CPT | Mod: TC

## 2023-05-12 PROCEDURE — G0463 HOSPITAL OUTPT CLINIC VISIT: HCPCS | Mod: 25

## 2023-05-12 PROCEDURE — 99213 OFFICE O/P EST LOW 20 MIN: CPT | Performed by: STUDENT IN AN ORGANIZED HEALTH CARE EDUCATION/TRAINING PROGRAM

## 2023-05-12 PROCEDURE — 250N000011 HC RX IP 250 OP 636: Performed by: STUDENT IN AN ORGANIZED HEALTH CARE EDUCATION/TRAINING PROGRAM

## 2023-05-12 PROCEDURE — 96372 THER/PROPH/DIAG INJ SC/IM: CPT | Performed by: STUDENT IN AN ORGANIZED HEALTH CARE EDUCATION/TRAINING PROGRAM

## 2023-05-12 RX ORDER — KETOROLAC TROMETHAMINE 30 MG/ML
30 INJECTION, SOLUTION INTRAMUSCULAR; INTRAVENOUS ONCE
Status: COMPLETED | OUTPATIENT
Start: 2023-05-12 | End: 2023-05-12

## 2023-05-12 RX ORDER — CYCLOBENZAPRINE HCL 5 MG
5 TABLET ORAL 2 TIMES DAILY PRN
Qty: 30 TABLET | Refills: 0 | Status: SHIPPED | OUTPATIENT
Start: 2023-05-12 | End: 2023-08-03

## 2023-05-12 RX ADMIN — KETOROLAC TROMETHAMINE 30 MG: 30 INJECTION, SOLUTION INTRAMUSCULAR; INTRAVENOUS at 11:09

## 2023-05-12 ASSESSMENT — ANXIETY QUESTIONNAIRES
IF YOU CHECKED OFF ANY PROBLEMS ON THIS QUESTIONNAIRE, HOW DIFFICULT HAVE THESE PROBLEMS MADE IT FOR YOU TO DO YOUR WORK, TAKE CARE OF THINGS AT HOME, OR GET ALONG WITH OTHER PEOPLE: NOT DIFFICULT AT ALL
GAD7 TOTAL SCORE: 2
GAD7 TOTAL SCORE: 2
1. FEELING NERVOUS, ANXIOUS, OR ON EDGE: NOT AT ALL
5. BEING SO RESTLESS THAT IT IS HARD TO SIT STILL: NOT AT ALL
3. WORRYING TOO MUCH ABOUT DIFFERENT THINGS: NOT AT ALL
6. BECOMING EASILY ANNOYED OR IRRITABLE: SEVERAL DAYS
4. TROUBLE RELAXING: SEVERAL DAYS
7. FEELING AFRAID AS IF SOMETHING AWFUL MIGHT HAPPEN: NOT AT ALL
2. NOT BEING ABLE TO STOP OR CONTROL WORRYING: NOT AT ALL

## 2023-05-12 ASSESSMENT — PATIENT HEALTH QUESTIONNAIRE - PHQ9: SUM OF ALL RESPONSES TO PHQ QUESTIONS 1-9: 4

## 2023-05-12 ASSESSMENT — PAIN SCALES - GENERAL: PAINLEVEL: MILD PAIN (3)

## 2023-05-12 NOTE — PATIENT INSTRUCTIONS
Try topical voltaren gel three times daily   Consider scheduling ibuprofen 600mg every 8 hours or naproxen 500mg twice daily   Use muscle relaxer, especially at night  Do exercises twice daily   Use heat as much as possible   Chiropractor will call   If any high fevers, weakness in your arms or legs, persistent numbness or worsening severe pain - need emergent follow up

## 2023-05-12 NOTE — PROGRESS NOTES
Assessment & Plan     Strain of neck muscle, initial encounter  Cervical pain  4 days of persistent muscle strain/tension in neck with notable tension on examination but no radiculopathy or evidence of weakness.  Certainly no signs of meningitis, no systemic symptoms and negative Kernig and Brudzinski.  Low suspicion for a disc herniation but with significant spasm, always a consideration.  Reassuring that she is not having radiculopathy and weakness, as above.    Cervical spine x-ray reviewed which does show muscle spasm/straightening.  Also possible arthritis in the C6/C7 area.  Recommend intensive treatment of the muscle spasm, did recommend scheduled high-dose NSAIDs versus prednisone, however patient does not like to take pills.  She did agree to Toradol and will do topical Voltaren gel.  In addition, recommend muscle relaxer, at least at night.  Exercises given and recommended to do twice daily.  She was open to chiropractor referral, as this has helped in the past.  Follow-up planned in 10 days.  Reviewed signs and symptoms that indicate need for urgent or sooner follow-up and she was understanding.  May need to consider physical therapy and/or MRI if not improving at follow-up.  - XR CERVICAL SPINE 2/3 VWS (Clinic Performed); Future  - ketorolac (TORADOL) injection 30 mg  - cyclobenzaprine (FLEXERIL) 5 MG tablet; Take 1 tablet (5 mg) by mouth 2 times daily as needed for muscle spasms  - diclofenac (VOLTAREN) 1 % topical gel; Apply 2 g topically 3 times daily Apply to neck muscles/areas of tension  - Chiropractic Referral        Osiris Hoang MD  [facility] [place]   Morelia is a 37 year old, presenting for the following health issues:  Pain    HPI     Pain History:  When did you first notice your pain? Sunday morning    Have you seen anyone else for your pain? No  How has your pain affected your ability to work? Not currently working - unrelated to pain  Where in your body  "do you have pain? Neck Pain  Onset/Duration: last Sunday   Description:   Location: middle, base of head   Radiation: nto the left shoulder  Intensity: mild- moderate   Progression of Symptoms:  same  Accompanying Signs & Symptoms:  Burning, tingling, prickly sensation in arm(s): No  Numbness in arm(s): YES - only if she sleeps on that arm  Weakness in arm(s):  No  Fever: No  Headache: YES- occasionally   Nausea and/or vomiting: YES- nausea occasionally   History:   Trauma: No  Previous neck pain: No  Previous surgery or injections: No  Previous Imaging (MRI,X ray): YES- xray on spine had arthritis   Precipitating or alleviating factors: laying down makes it a lot worse   Does movement impact the pain:  YES- slightly- couldn't move at all when it first started  Therapies tried and outcome: heat, massage and cream      Started Sunday. Didn't start in the AM. Bent over and felt it stiffen. Wasn't lifting anything. Couldn't move. Hard to sit still. Thought she had slept on it wrong - had slept on back and she normally sleeps on side. Usually will go away in a few days. Every night though has been getting worse - hard to get in position of comfort. Waking up a lot and early in AM will massage and put topical cream on. Standing during the day its present but not as bad. Dulls during the day. Arm will fall asleep at night if she is sleeping on that side. No numbness or weakness during the day. No fevers. No headache, intermittent. Was bad at base of head when it first started. Has happened before but hasn't lasted as long. No emesis. Not taking anything for pain. Has seen chiro/accupuncture in the past. Did try Bengay cream - does help relax muscles.         Objective    BP 95/65 (BP Location: Right arm, Patient Position: Sitting, Cuff Size: Adult Large)   Pulse 73   Temp 98.2  F (36.8  C) (Tympanic)   Ht 1.676 m (5' 6\")   Wt 83.7 kg (184 lb 9.6 oz)   LMP 04/16/2023 (Approximate)   SpO2 97%   BMI 29.80 kg/m    Body " mass index is 29.8 kg/m .     Physical Exam   GENERAL: healthy, alert and no distress  NECK: no adenopathy, no asymmetry, masses, or scars; negative kernig and brudzinksi sign   RESP: lungs clear to auscultation - no rales, rhonchi or wheezes  CV: regular rate and rhythm, normal S1 S2, no S3 or S4, no murmur, click or rub, no peripheral edema and peripheral pulses strong  MS: MSK: PALPATION: no cervical tenderness; no thoracic tenderness; no lumbar tenderness. Tender over bilateral paraspinal muscle. Notable tension in traps and at base of occiput. No bony step offs appreciated.   ROM: full neck ROM; normal spinal flexion, no pain; normal spinal extension, no pain; right rotation full; left rotation slightly limited   STRENGTH:   LE Right   5/5 strength with hip flexion (L2-L3)  5/5 strength with hip extension (L4-L5)  5/5 strength with leg extension (L3-L4)  5/5 strength with leg flexion (L5-S1)  5/5 strength with dorsiflexion (L4-L5)  5/5 strength with plantarflexion (S1-S2)  LE Left  5/5 strength with hip flexion  5/5 strength with hip extension  5/5 strength with leg extension  5/5 strength with leg flexion  5/5 strength with dorsiflexion  5/5 strength with plantarflexion  UE Right  5/5 strength with shoulder abduction (C5)  5/5 strength with elbow flexion (C5-C6)  5/5 strength with elbow extension (C6-C7)  5/5 strength with wrist extension (C6-C7)  5/5 strength with wrist flexion (C7-C8)  5/5 strength with finger flexion (C8, median)  5/5 strength with finger extension (C8, radial)  5/5 strength with finger abduction (T1, ulnar)  UE Left   5/5 strength with shoulder abduction (C5)  5/5 strength with elbow flexion (C5-C6)  5/5 strength with elbow extension (C6-C7)  5/5 strength with wrist extension (C6-C7)  5/5 strength with wrist flexion (C7-C8)  5/5 strength with finger flexion (C8, median)  5/5 strength with finger extension (C8, radial)  5/5 strength with finger abduction (T1, ulnar)  GAIT: Gait normal, no  limp.   Special Tests: neg  lhermitte sign; neg Spurling  SENSATION: intact  R LE: Proximal thigh: intact; Medial leg (L4): intact; Lateral leg (L5): intact; Posterior leg (S1-S2): intact; medial foot (L4): intact; mid foot (L5): intact; lateral foot (S1): intact  L LE: Proximal thigh: intact; Medial leg (L4): intact; Lateral leg (L5): intact; Posterior leg (S1-S2): intact; medial foot (L4): intact; mid foot (L5): intact; lateral foot (S1): intact    R UE : upper neck (C4): normal; radial arm (C6): normal; mid arm (C7): normal; ulnar arm (C8): normal   L UE : upper neck (C4): normal; radial arm (C6): normal; mid arm (C7): normal; ulnar arm (C8): normal        Results for orders placed or performed in visit on 05/12/23   XR CERVICAL SPINE 2/3 VWS (Clinic Performed)     Status: None    Narrative    Exam: XR CERVICAL SPINE 2/3 VIEWS     History:Female, age 37 years, Strain of neck muscle, initial  encounter; Cervical pain    Comparison:  No relevant prior imaging.    Technique: Three views are submitted.    Findings: Bones are normally mineralized. No evidence of acute or  subacute fracture.  Vertebral bodies and posterior elements are well  aligned.           Impression    Impression:  No evidence of acute or subacute bony abnormality.     Cervical spine straightening suggesting muscle strain.    Mild endplate degeneration at C6-7.    MOODY MILLER MD         SYSTEM ID:  [id number]

## 2023-05-22 NOTE — PROGRESS NOTES
{PROVIDER CHARTING PREFERENCE:871837}    Subjective   Morelia is a 37 year old, presenting for the following health issues:  No chief complaint on file.        5/12/2023    10:18 AM   Additional Questions   Roomed by dino francis   Accompanied by self     HPI     {Chronic Problem SUPERLIST:127545}    How many servings of fruits and vegetables do you eat daily?  { :965306}    On average, how many sweetened beverages do you drink each day (Examples: soda, juice, sweet tea, etc.  Do NOT count diet or artificially sweetened beverages)?   { 1-11:951519}    How many days per week do you exercise enough to make your heart beat faster? { :990050}    How many minutes a day do you exercise enough to make your heart beat faster? { :650909}    How many days per week do you miss taking your medication? {0-7 :723820}    {additonal problems for provider to add (Optional):587337}      Review of Systems   {ROS COMP (Optional):261369}      Objective    LMP 04/16/2023 (Approximate)   There is no height or weight on file to calculate BMI.  Physical Exam   {Exam List (Optional):939006}    {Diagnostic Test Results (Optional):125927}    {AMBULATORY ATTESTATION (Optional):364194}

## 2023-05-22 NOTE — PROGRESS NOTES
Assessment & Plan     Strain of neck muscle, initial encounter  Much improved, near resolved. Responded well to Toradol and supportive care. No lingering tension, no weakness, or other symptoms.   Discussed continuing HEP.   Reviewed s/sx indicative of need for follow up. May need physical therapy or MRI consideration with her degenerative changes in the future.     Follow up with PCP for physical.     Osiris Haong MD  [facility] - SHANTEL Thibodeaux is a 37 year old, presenting for the following health issues:  Pain (Neck /)    HPI   Answers for HPI/ROS submitted by the patient on 5/23/2023  If you checked off any problems, how difficult have these problems made it for you to do your work, take care of things at home, or get along with other people?: Not difficult at all  PHQ9 TOTAL SCORE: 1  ROBERT 7 TOTAL SCORE: 0      Pain History:  When did you first notice your pain? 2 weeks ago    Have you seen anyone else for your pain? No  How has your pain affected your ability to work? 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 Neck Pain  Onset/Duration: 2 week ago   Description:   Location: neck   Radiation: none  Intensity: mild  Progression of Symptoms:  improving  Accompanying Signs & Symptoms:  Burning, tingling, prickly sensation in arm(s): No  Numbness in arm(s): No  Weakness in arm(s):  No  Fever: No  Headache: No  Nausea and/or vomiting: No  History:   Trauma: No  Previous neck pain: No  Previous surgery or injections: No  Previous Imaging (MRI,X ray): YES- xray   Precipitating or alleviating factors: none   Does movement impact the pain:  No  Therapies tried and outcome: rest/inactivity, heat, massage and topical cream     Initial visit 5/12/2023. See note.   Tension had Started 5/8/2023.   Toradol really helped. Was very impressed with Toradol. Dodge relief very quickly. Felt better even when  "she left the clinic. Noticed good relief for three days.   Was under a lot of stress when this started - is improving.   Did massage, heat and resting. No chiro. No muscle relaxer. Doing home exercises.   Very close to 100% improved.   Full range of motion. No weakness. No headache.   Declines any further workup at this time.         Objective    BP 99/66 (BP Location: Left arm, Patient Position: Sitting, Cuff Size: Adult Large)   Pulse 66   Temp 98.2  F (36.8  C) (Tympanic)   Ht 1.676 m (5' 6\")   Wt 83.5 kg (184 lb 1.6 oz)   LMP 05/21/2023 (Exact Date)   SpO2 97%   BMI 29.71 kg/m    Body mass index is 29.71 kg/m .     Physical Exam  Constitutional:       General: She is not in acute distress.     Appearance: Normal appearance. She is not ill-appearing.   Neck:      Comments: Full range of motion of neck, no evidence of stiffness or spasm  Musculoskeletal:      Cervical back: Normal range of motion.   Neurological:      General: No focal deficit present.      Mental Status: She is alert and oriented to person, place, and time.   Psychiatric:         Mood and Affect: Mood normal.         Behavior: Behavior normal.                            "

## 2023-05-23 ENCOUNTER — OFFICE VISIT (OUTPATIENT)
Dept: FAMILY MEDICINE | Facility: OTHER | Age: 38
End: 2023-05-23
Attending: STUDENT IN AN ORGANIZED HEALTH CARE EDUCATION/TRAINING PROGRAM
Payer: COMMERCIAL

## 2023-05-23 VITALS
HEART RATE: 66 BPM | OXYGEN SATURATION: 97 % | SYSTOLIC BLOOD PRESSURE: 99 MMHG | WEIGHT: 184.1 LBS | DIASTOLIC BLOOD PRESSURE: 66 MMHG | HEIGHT: 66 IN | BODY MASS INDEX: 29.59 KG/M2 | TEMPERATURE: 98.2 F

## 2023-05-23 DIAGNOSIS — S16.1XXA STRAIN OF NECK MUSCLE, INITIAL ENCOUNTER: Primary | ICD-10-CM

## 2023-05-23 PROBLEM — F41.1 GAD (GENERALIZED ANXIETY DISORDER): Chronic | Status: ACTIVE | Noted: 2018-06-07

## 2023-05-23 PROBLEM — K90.41 GLUTEN-SENSITIVE ENTEROPATHY: Chronic | Status: ACTIVE | Noted: 2018-04-04

## 2023-05-23 PROCEDURE — G0463 HOSPITAL OUTPT CLINIC VISIT: HCPCS

## 2023-05-23 PROCEDURE — 99213 OFFICE O/P EST LOW 20 MIN: CPT | Performed by: STUDENT IN AN ORGANIZED HEALTH CARE EDUCATION/TRAINING PROGRAM

## 2023-05-23 ASSESSMENT — ANXIETY QUESTIONNAIRES
GAD7 TOTAL SCORE: 0
3. WORRYING TOO MUCH ABOUT DIFFERENT THINGS: NOT AT ALL
2. NOT BEING ABLE TO STOP OR CONTROL WORRYING: NOT AT ALL
4. TROUBLE RELAXING: NOT AT ALL
IF YOU CHECKED OFF ANY PROBLEMS ON THIS QUESTIONNAIRE, HOW DIFFICULT HAVE THESE PROBLEMS MADE IT FOR YOU TO DO YOUR WORK, TAKE CARE OF THINGS AT HOME, OR GET ALONG WITH OTHER PEOPLE: NOT DIFFICULT AT ALL
6. BECOMING EASILY ANNOYED OR IRRITABLE: NOT AT ALL
GAD7 TOTAL SCORE: 0
GAD7 TOTAL SCORE: 0
5. BEING SO RESTLESS THAT IT IS HARD TO SIT STILL: NOT AT ALL
8. IF YOU CHECKED OFF ANY PROBLEMS, HOW DIFFICULT HAVE THESE MADE IT FOR YOU TO DO YOUR WORK, TAKE CARE OF THINGS AT HOME, OR GET ALONG WITH OTHER PEOPLE?: NOT DIFFICULT AT ALL
7. FEELING AFRAID AS IF SOMETHING AWFUL MIGHT HAPPEN: NOT AT ALL
1. FEELING NERVOUS, ANXIOUS, OR ON EDGE: NOT AT ALL
7. FEELING AFRAID AS IF SOMETHING AWFUL MIGHT HAPPEN: NOT AT ALL

## 2023-05-23 ASSESSMENT — PATIENT HEALTH QUESTIONNAIRE - PHQ9
10. IF YOU CHECKED OFF ANY PROBLEMS, HOW DIFFICULT HAVE THESE PROBLEMS MADE IT FOR YOU TO DO YOUR WORK, TAKE CARE OF THINGS AT HOME, OR GET ALONG WITH OTHER PEOPLE: NOT DIFFICULT AT ALL
SUM OF ALL RESPONSES TO PHQ QUESTIONS 1-9: 1
SUM OF ALL RESPONSES TO PHQ QUESTIONS 1-9: 1

## 2023-05-23 ASSESSMENT — PAIN SCALES - GENERAL: PAINLEVEL: NO PAIN (0)

## 2023-06-02 ENCOUNTER — TELEPHONE (OUTPATIENT)
Dept: FAMILY MEDICINE | Facility: OTHER | Age: 38
End: 2023-06-02

## 2023-06-02 NOTE — TELEPHONE ENCOUNTER
Received referral for patient to see Dr. Carrera. Patient was called 5-09-23 and 05/23/23. Called again 06/02/2023 and offered appt. Patient has declined appt at this time. Stated she is doing better. Total her to give us a call if does need to make appt.

## 2023-06-03 ENCOUNTER — HEALTH MAINTENANCE LETTER (OUTPATIENT)
Age: 38
End: 2023-06-03

## 2023-08-02 NOTE — PROGRESS NOTES
SUBJECTIVE:   CC: Morelia is an 38 year old who presents for preventive health visit.       5/23/2023     9:55 AM   Additional Questions   Roomed by dino francis   Accompanied by self     Healthy Habits:     Getting at least 3 servings of Calcium per day:  NO    Bi-annual eye exam:  Yes    Dental care twice a year:  NO    Sleep apnea or symptoms of sleep apnea:  None    Diet:  Gluten-free/reduced and Other    Frequency of exercise:  4-5 days/week    Duration of exercise:  15-30 minutes    Taking medications regularly:  Not Applicable    Medication side effects:  Not applicable    Additional concerns today:  Yes    Hyperlipidemia Follow-Up    Are you regularly taking any medication or supplement to lower your cholesterol?   No  Are you having muscle aches or other side effects that you think could be caused by your cholesterol lowering medication?  No    Depression and Anxiety Follow-Up  How are you doing with your depression since your last visit? Improved   How are you doing with your anxiety since your last visit?  No change  Are you having other symptoms that might be associated with depression or anxiety? No  Have you had a significant life event? OTHER: cousin lost baby in may    Do you have any concerns with your use of alcohol or other drugs? No    Answers submitted by the patient for this visit:  Patient Health Questionnaire (Submitted on 8/3/2023)  If you checked off any problems, how difficult have these problems made it for you to do your work, take care of things at home, or get along with other people?: Somewhat difficult  PHQ9 TOTAL SCORE: 6  ROBERT-7 (Submitted on 8/3/2023)  ROBERT 7 TOTAL SCORE: 5      Social History     Tobacco Use    Smoking status: Never    Smokeless tobacco: Never    Tobacco comments:     no passive exposure   Vaping Use    Vaping Use: Never used   Substance Use Topics    Alcohol use: Yes     Comment: rarely 1/year    Drug use: No         5/12/2023    11:00 AM 5/23/2023     9:52 AM  8/3/2023     1:02 PM   PHQ   PHQ-9 Total Score 4 1 6   Q9: Thoughts of better off dead/self-harm past 2 weeks Not at all Not at all Not at all         5/12/2023    11:00 AM 5/23/2023     9:53 AM 8/3/2023     1:03 PM   ROBERT-7 SCORE   Total Score  0 (minimal anxiety) 5 (mild anxiety)   Total Score 2 0 5         8/3/2023     1:02 PM   Last PHQ-9   1.  Little interest or pleasure in doing things 0   2.  Feeling down, depressed, or hopeless 1   3.  Trouble falling or staying asleep, or sleeping too much 2   4.  Feeling tired or having little energy 1   5.  Poor appetite or overeating 1   6.  Feeling bad about yourself 0   7.  Trouble concentrating 1   8.  Moving slowly or restless 0   Q9: Thoughts of better off dead/self-harm past 2 weeks 0   PHQ-9 Total Score 6         8/3/2023     1:03 PM   ROBERT-7    1. Feeling nervous, anxious, or on edge 2   2. Not being able to stop or control worrying 0   3. Worrying too much about different things 1   4. Trouble relaxing 1   5. Being so restless that it is hard to sit still 0   6. Becoming easily annoyed or irritable 1   7. Feeling afraid, as if something awful might happen 0   ROBERT-7 Total Score 5   If you checked any problems, how difficult have they made it for you to do your work, take care of things at home, or get along with other people? Not difficult at all       Suicide Assessment Five-step Evaluation and Treatment (SAFE-T)        Social History     Tobacco Use    Smoking status: Never    Smokeless tobacco: Never    Tobacco comments:     no passive exposure   Substance Use Topics    Alcohol use: Yes     Comment: rarely 1/year             8/3/2023     1:09 PM   Alcohol Use   Prescreen: >3 drinks/day or >7 drinks/week? Not Applicable   Reviewed orders with patient.  Reviewed health maintenance and updated orders accordingly - Yes  Lab work is in process  Labs reviewed in EPIC  BP Readings from Last 3 Encounters:   08/03/23 108/62   05/23/23 99/66   05/12/23 95/65    Wt  "Readings from Last 3 Encounters:   23 84.6 kg (186 lb 6.4 oz)   23 83.5 kg (184 lb 1.6 oz)   23 83.7 kg (184 lb 9.6 oz)               Patient Active Problem List   Diagnosis     delivery delivered    Hyperlipidemia LDL goal <130    ACP (advance care planning)    Attention deficit hyperactivity disorder (ADHD), combined type    Other insomnia    Gluten-sensitive enteropathy    ROBERT (generalized anxiety disorder)    Nausea    Dysmenorrhea    Dental anomaly    Family history of first-degree relative with cardiomyopathy    Premenstrual syndrome    Encounter for screening for other viral diseases    Cervical cancer screening     Past Surgical History:   Procedure Laterality Date     SECTION  2012    Pregnancy-full term \"Shelomoh\" \"Oscar\"     SECTION       SECTION  2014    Procedure:  SECTION;  Surgeon: Gaston Norton MD;  Location: [facility]    wisdom teeth extraction         Social History     Tobacco Use    Smoking status: Never    Smokeless tobacco: Never    Tobacco comments:     no passive exposure   Substance Use Topics    Alcohol use: Not Currently     Comment: rarely 1/year     Family History   Problem Relation Age of Onset    Respiratory Mother         chronic bronchitis yearly    Depression Mother         fatigue    Diabetes Type 2  Mother 58    Hyperlipidemia Mother     GERD Father     Sleep Disorder Father     Irritable Bowel Syndrome Father     Hypertension Father     Blood Disease Brother         varicose veins    Cardiomyopathy Brother         large heart    Hypertension Brother     Morbid Obesity Brother     Heart Disease Maternal Grandmother         CHF, lung ca??/mass    LUNG DISEASE Maternal Grandfather         +tobacco    Dementia Maternal Grandfather     Emphysema Maternal Grandfather     Depression Paternal Grandmother         ADHD    Sleep Apnea Paternal Grandmother         insomnia?    Breast Cancer Paternal Grandmother 81    " Coronary Artery Disease Paternal Grandfather         AMI    Cardiomyopathy Maternal Aunt     Breast Cancer Maternal Aunt 59         Current Outpatient Medications   Medication Sig Dispense Refill    Ascorbic Acid (VITAMIN C PO) Take by mouth as needed      lactobacillus rhamnosus, GG, (CULTURELL) capsule Take 1 capsule by mouth as needed      Multiple Vitamins-Minerals (MULTIVITAMIN ADULT PO)       UNABLE TO FIND 1 Tablespoonful Super beets      Vitamin D3 (CHOLECALCIFEROL) 125 MCG (5000 UT) tablet Take by mouth daily      Zinc Acetate, Oral, (ZINC ACETATE PO)       cyclobenzaprine (FLEXERIL) 5 MG tablet Take 1 tablet (5 mg) by mouth 2 times daily as needed for muscle spasms (Patient not taking: Reported on 8/3/2023) 30 tablet 0    diclofenac (VOLTAREN) 1 % topical gel Apply 2 g topically 3 times daily Apply to neck muscles/areas of tension (Patient not taking: Reported on 8/3/2023) 100 g 0     Allergies   Allergen Reactions    Dust Mite Extract      Other reaction(s): Sneezing    Latex Rash       Breast Cancer Screenin/8/2021     9:39 AM   Breast CA Risk Assessment (FHS-7)   Do you have a family history of breast, colon, or ovarian cancer? No / Unknown       Patient under 40 years of age: Routine Mammogram Screening not recommended.   Pertinent mammograms are reviewed under the imaging tab.    History of abnormal Pap smear: NO - age 30-65 PAP every 5 years with negative HPV co-testing recommended      Latest Ref Rng & Units 2021    10:44 AM 2015    12:00 AM 2013     2:56 PM   PAP / HPV   PAP  Negative for Intraepithelial Lesion or Malignancy (NILM)      PAP (Historical)   NIL  NIL    HPV 16 DNA Negative Negative      HPV 18 DNA Negative Negative      Other HR HPV Negative Negative        Reviewed and updated as needed this visit by clinical staff   Tobacco  Allergies  Meds              Reviewed and updated as needed this visit by Provider                 Past Medical History:  "  Diagnosis Date    ADHD 2015    Anemia     after 1st pregnancy, hgb 6, ok for auto transfusion or for her mom to donate.    Gluten-sensitive enteropathy 2016    Insomnia 2008    Post partum depression     after 1st pregnancy      Past Surgical History:   Procedure Laterality Date     SECTION  2012    Pregnancy-full term \"Shelomoh\" \"Oscar\"     SECTION       SECTION  2014    Procedure:  SECTION;  Surgeon: Gaston Norton MD;  Location: [facility]    wisdom teeth extraction       OB History    Para Term  AB Living   5 3 3 0 2 3   SAB IAB Ectopic Multiple Live Births   2 0 0 0 3      # Outcome Date GA Lbr Abran/2nd Weight Sex Delivery Anes PTL Lv   5 Term 14 41w3d  4.508 kg (9 lb 15 oz) M CS-LTranv Spinal  [facility]      Name: CATHRYN ARENAS      Apgar1: 8  Apgar5: 9   4 Term 12   4.451 kg (9 lb 13 oz) M -SEC   [facility]O      Name: Harriet   3 Term 11/10/11   3.685 kg (8 lb 2 oz) M -SEC   [facility]O      Name: Saul   2 SAB            1 SAB               Obstetric Comments   2 SABs 7 weeks and under   Breast fed       Review of Systems   Constitutional:  Negative for chills and fever.   HENT:  Negative for congestion, ear pain, hearing loss and sore throat.    Eyes:  Negative for pain and visual disturbance.   Respiratory:  Negative for cough and shortness of breath.    Cardiovascular:  Negative for chest pain, palpitations and peripheral edema.   Gastrointestinal:  Negative for abdominal pain, constipation, diarrhea, heartburn, hematochezia and nausea.   Breasts:  Negative for tenderness, breast mass and discharge.   Genitourinary:  Negative for dysuria, frequency, genital sores, hematuria, pelvic pain, urgency, vaginal bleeding and vaginal discharge.   Musculoskeletal:  Negative for arthralgias, joint swelling and myalgias.   Skin:  Negative for rash.   Neurological:  Negative for dizziness, weakness, headaches and paresthesias. "   Psychiatric/Behavioral:  Negative for mood changes. The patient is not nervous/anxious.        OBJECTIVE:   /62   Pulse 66   Temp 99.3  F (37.4  C) (Tympanic)   Resp 17   Wt 84.6 kg (186 lb 6.4 oz)   SpO2 98%   BMI 30.09 kg/m    Physical Exam  GENERAL: healthy, alert and no distress  EYES: Eyes grossly normal to inspection, PERRL and conjunctivae and sclerae normal  HENT: ear canals and TM's normal, nose and mouth without ulcers or lesions  NECK: no adenopathy, no asymmetry, masses, or scars and thyroid normal to palpation  RESP: lungs clear to auscultation - no rales, rhonchi or wheezes  BREAST: normal without masses, tenderness or nipple discharge and no palpable axillary masses or adenopathy  CV: regular rate and rhythm, normal S1 S2, no S3 or S4, no murmur, click or rub, no peripheral edema and peripheral pulses strong  ABDOMEN: soft, nontender, no hepatosplenomegaly, no masses and bowel sounds normal   (female): normal female external genitalia, normal urethral meatus, vaginal mucosa pink, moist, well rugated, and normal cervix/adnexa/uterus without masses or discharge   (female): pap and wet prep done  MS: no gross musculoskeletal defects noted, no edema  SKIN: no suspicious lesions or rashes  NEURO: Normal strength and tone, mentation intact and speech normal  PSYCH: mentation appears normal, affect normal/bright    Diagnostic Test Results:  Labs reviewed in Epic  Results for orders placed or performed in visit on 08/03/23   Comprehensive metabolic panel     Status: Normal   Result Value Ref Range    Sodium 140 136 - 145 mmol/L    Potassium 3.9 3.4 - 5.3 mmol/L    Chloride 104 98 - 107 mmol/L    Carbon Dioxide (CO2) 25 22 - 29 mmol/L    Anion Gap 11 7 - 15 mmol/L    Urea Nitrogen 9.7 6.0 - 20.0 mg/dL    Creatinine 0.68 0.51 - 0.95 mg/dL    Calcium 9.6 8.6 - 10.0 mg/dL    Glucose 94 70 - 99 mg/dL    Alkaline Phosphatase 63 35 - 104 U/L    AST 14 0 - 45 U/L    ALT 21 0 - 50 U/L    Protein  Total 6.9 6.4 - 8.3 g/dL    Albumin 4.4 3.5 - 5.2 g/dL    Bilirubin Total 0.5 <=1.2 mg/dL    GFR Estimate >90 >60 mL/min/1.73m2   Lipid Profile (Chol, Trig, HDL, LDL calc)     Status: Abnormal   Result Value Ref Range    Cholesterol 126 <200 mg/dL    Triglycerides 61 <150 mg/dL    Direct Measure HDL 44 (L) >=50 mg/dL    LDL Cholesterol Calculated 70 <=100 mg/dL    Non HDL Cholesterol 82 <130 mg/dL    Narrative    Cholesterol  Desirable:  <200 mg/dL    Triglycerides  Normal:  Less than 150 mg/dL  Borderline High:  150-199 mg/dL  High:  200-499 mg/dL  Very High:  Greater than or equal to 500 mg/dL    Direct Measure HDL  Female:  Greater than or equal to 50 mg/dL   Male:  Greater than or equal to 40 mg/dL    LDL Cholesterol  Desirable:  <100mg/dL  Above Desirable:  100-129 mg/dL   Borderline High:  130-159 mg/dL   High:  160-189 mg/dL   Very High:  >= 190 mg/dL    Non HDL Cholesterol  Desirable:  130 mg/dL  Above Desirable:  130-159 mg/dL  Borderline High:  160-189 mg/dL  High:  190-219 mg/dL  Very High:  Greater than or equal to 220 mg/dL   Wet prep     Status: Abnormal    Specimen: Vagina; Swab   Result Value Ref Range    Trichomonas Absent Absent    Yeast Absent Absent    Clue Cells Absent Absent    WBCs/high power field 2+ (A) None       ASSESSMENT/PLAN:   (Z00.00) Routine general medical examination at a health care facility  (primary encounter diagnosis)  Comment:   Plan: follow-up 1 year    (Z11.59) Encounter for screening for other viral diseases  Comment:   Plan: Hepatitis C Screen Reflex to HCV RNA Quant and         Genotype        due    (E78.5) Hyperlipidemia LDL goal <130  Comment:   Plan: Comprehensive metabolic panel, Lipid Profile         (Chol, Trig, HDL, LDL calc)        Good levels    (Z12.4) Cervical cancer screening  Comment:   Plan: A pap thin layer screen with  HPV - recommended        age 30 - 65 years        due    (N89.8) Vaginal discharge  Comment:   Plan: Wet prep          (O24.410) Diet  "controlled gestational diabetes mellitus (GDM) in third trimester  Comment:   Plan: Hemoglobin A1c, Insulin level        pending    COUNSELING:  Reviewed preventive health counseling, as reflected in patient instructions  Special attention given to:        Regular exercise       Healthy diet/nutrition       Vision screening       Hearing screening       Consider Hep C screening for all patients one time for ages 18-79 years      BMI:   Estimated body mass index is 30.09 kg/m  as calculated from the following:    Height as of 5/23/23: 1.676 m (5' 6\").    Weight as of this encounter: 84.6 kg (186 lb 6.4 oz).   Weight management plan: Discussed healthy diet and exercise guidelines      She reports that she has never smoked. She has never used smokeless tobacco.          Aliyah Domínguez MD  [facility] [place]  "

## 2023-08-03 ENCOUNTER — OFFICE VISIT (OUTPATIENT)
Dept: FAMILY MEDICINE | Facility: OTHER | Age: 38
End: 2023-08-03
Attending: FAMILY MEDICINE
Payer: COMMERCIAL

## 2023-08-03 VITALS
OXYGEN SATURATION: 98 % | RESPIRATION RATE: 17 BRPM | HEART RATE: 66 BPM | SYSTOLIC BLOOD PRESSURE: 108 MMHG | BODY MASS INDEX: 30.09 KG/M2 | WEIGHT: 186.4 LBS | TEMPERATURE: 99.3 F | DIASTOLIC BLOOD PRESSURE: 62 MMHG

## 2023-08-03 DIAGNOSIS — Z11.59 ENCOUNTER FOR SCREENING FOR OTHER VIRAL DISEASES: ICD-10-CM

## 2023-08-03 DIAGNOSIS — Z00.00 ROUTINE GENERAL MEDICAL EXAMINATION AT A HEALTH CARE FACILITY: Primary | ICD-10-CM

## 2023-08-03 DIAGNOSIS — Z12.4 CERVICAL CANCER SCREENING: ICD-10-CM

## 2023-08-03 DIAGNOSIS — N89.8 VAGINAL DISCHARGE: ICD-10-CM

## 2023-08-03 DIAGNOSIS — E78.5 HYPERLIPIDEMIA LDL GOAL <130: ICD-10-CM

## 2023-08-03 DIAGNOSIS — O24.410 DIET CONTROLLED GESTATIONAL DIABETES MELLITUS (GDM) IN THIRD TRIMESTER: ICD-10-CM

## 2023-08-03 LAB
ALBUMIN SERPL BCG-MCNC: 4.4 G/DL (ref 3.5–5.2)
ALP SERPL-CCNC: 63 U/L (ref 35–104)
ALT SERPL W P-5'-P-CCNC: 21 U/L (ref 0–50)
ANION GAP SERPL CALCULATED.3IONS-SCNC: 11 MMOL/L (ref 7–15)
AST SERPL W P-5'-P-CCNC: 14 U/L (ref 0–45)
BILIRUB SERPL-MCNC: 0.5 MG/DL
BUN SERPL-MCNC: 9.7 MG/DL (ref 6–20)
CALCIUM SERPL-MCNC: 9.6 MG/DL (ref 8.6–10)
CHLORIDE SERPL-SCNC: 104 MMOL/L (ref 98–107)
CHOLEST SERPL-MCNC: 126 MG/DL
CLUE CELLS: ABNORMAL
CREAT SERPL-MCNC: 0.68 MG/DL (ref 0.51–0.95)
DEPRECATED HCO3 PLAS-SCNC: 25 MMOL/L (ref 22–29)
GFR SERPL CREATININE-BSD FRML MDRD: >90 ML/MIN/1.73M2
GLUCOSE SERPL-MCNC: 94 MG/DL (ref 70–99)
HDLC SERPL-MCNC: 44 MG/DL
LDLC SERPL CALC-MCNC: 70 MG/DL
NONHDLC SERPL-MCNC: 82 MG/DL
POTASSIUM SERPL-SCNC: 3.9 MMOL/L (ref 3.4–5.3)
PROT SERPL-MCNC: 6.9 G/DL (ref 6.4–8.3)
SODIUM SERPL-SCNC: 140 MMOL/L (ref 136–145)
TRICHOMONAS, WET PREP: ABNORMAL
TRIGL SERPL-MCNC: 61 MG/DL
WBC'S/HIGH POWER FIELD, WET PREP: ABNORMAL
YEAST, WET PREP: ABNORMAL

## 2023-08-03 PROCEDURE — 80053 COMPREHEN METABOLIC PANEL: CPT | Mod: ZL | Performed by: FAMILY MEDICINE

## 2023-08-03 PROCEDURE — G0123 SCREEN CERV/VAG THIN LAYER: HCPCS | Mod: ZL | Performed by: FAMILY MEDICINE

## 2023-08-03 PROCEDURE — 80061 LIPID PANEL: CPT | Mod: ZL | Performed by: FAMILY MEDICINE

## 2023-08-03 PROCEDURE — 99395 PREV VISIT EST AGE 18-39: CPT | Performed by: FAMILY MEDICINE

## 2023-08-03 PROCEDURE — 87210 SMEAR WET MOUNT SALINE/INK: CPT | Mod: ZL | Performed by: FAMILY MEDICINE

## 2023-08-03 PROCEDURE — G0463 HOSPITAL OUTPT CLINIC VISIT: HCPCS

## 2023-08-03 PROCEDURE — 87624 HPV HI-RISK TYP POOLED RSLT: CPT | Mod: ZL | Performed by: FAMILY MEDICINE

## 2023-08-03 PROCEDURE — 86803 HEPATITIS C AB TEST: CPT | Mod: ZL | Performed by: FAMILY MEDICINE

## 2023-08-03 PROCEDURE — 36415 COLL VENOUS BLD VENIPUNCTURE: CPT | Mod: ZL | Performed by: FAMILY MEDICINE

## 2023-08-03 SDOH — HEALTH STABILITY: PHYSICAL HEALTH: ON AVERAGE, HOW MANY DAYS PER WEEK DO YOU ENGAGE IN MODERATE TO STRENUOUS EXERCISE (LIKE A BRISK WALK)?: 3 DAYS

## 2023-08-03 SDOH — HEALTH STABILITY: PHYSICAL HEALTH: ON AVERAGE, HOW MANY MINUTES DO YOU ENGAGE IN EXERCISE AT THIS LEVEL?: 20 MIN

## 2023-08-03 ASSESSMENT — PATIENT HEALTH QUESTIONNAIRE - PHQ9
SUM OF ALL RESPONSES TO PHQ QUESTIONS 1-9: 6
SUM OF ALL RESPONSES TO PHQ QUESTIONS 1-9: 6
10. IF YOU CHECKED OFF ANY PROBLEMS, HOW DIFFICULT HAVE THESE PROBLEMS MADE IT FOR YOU TO DO YOUR WORK, TAKE CARE OF THINGS AT HOME, OR GET ALONG WITH OTHER PEOPLE: SOMEWHAT DIFFICULT

## 2023-08-03 ASSESSMENT — ENCOUNTER SYMPTOMS
WEAKNESS: 0
PALPITATIONS: 0
CHILLS: 0
FEVER: 0
FREQUENCY: 0
CONSTIPATION: 0
MYALGIAS: 0
BREAST MASS: 0
EYE PAIN: 0
JOINT SWELLING: 0
DYSURIA: 0
HEARTBURN: 0
ARTHRALGIAS: 0
ABDOMINAL PAIN: 0
HEMATURIA: 0
DIARRHEA: 0
DIZZINESS: 0
SHORTNESS OF BREATH: 0
NERVOUS/ANXIOUS: 0
COUGH: 0
SORE THROAT: 0
PARESTHESIAS: 0
NAUSEA: 0
HEADACHES: 0
HEMATOCHEZIA: 0

## 2023-08-03 ASSESSMENT — ANXIETY QUESTIONNAIRES
IF YOU CHECKED OFF ANY PROBLEMS ON THIS QUESTIONNAIRE, HOW DIFFICULT HAVE THESE PROBLEMS MADE IT FOR YOU TO DO YOUR WORK, TAKE CARE OF THINGS AT HOME, OR GET ALONG WITH OTHER PEOPLE: NOT DIFFICULT AT ALL
5. BEING SO RESTLESS THAT IT IS HARD TO SIT STILL: NOT AT ALL
2. NOT BEING ABLE TO STOP OR CONTROL WORRYING: NOT AT ALL
4. TROUBLE RELAXING: SEVERAL DAYS
7. FEELING AFRAID AS IF SOMETHING AWFUL MIGHT HAPPEN: NOT AT ALL
GAD7 TOTAL SCORE: 5
6. BECOMING EASILY ANNOYED OR IRRITABLE: SEVERAL DAYS
3. WORRYING TOO MUCH ABOUT DIFFERENT THINGS: SEVERAL DAYS
1. FEELING NERVOUS, ANXIOUS, OR ON EDGE: MORE THAN HALF THE DAYS
GAD7 TOTAL SCORE: 5

## 2023-08-03 ASSESSMENT — LIFESTYLE VARIABLES
HOW OFTEN DO YOU HAVE A DRINK CONTAINING ALCOHOL: MONTHLY OR LESS
HOW MANY STANDARD DRINKS CONTAINING ALCOHOL DO YOU HAVE ON A TYPICAL DAY: 1 OR 2
AUDIT-C TOTAL SCORE: 1
HOW OFTEN DO YOU HAVE SIX OR MORE DRINKS ON ONE OCCASION: NEVER
SKIP TO QUESTIONS 9-10: 1

## 2023-08-03 ASSESSMENT — PAIN SCALES - GENERAL: PAINLEVEL: NO PAIN (0)

## 2023-08-05 LAB — HCV AB SERPL QL IA: NONREACTIVE

## 2023-08-09 LAB
BKR LAB AP GYN ADEQUACY: NORMAL
BKR LAB AP GYN INTERPRETATION: NORMAL
BKR LAB AP HPV REFLEX: NORMAL
BKR LAB AP PREVIOUS ABNORMAL: NORMAL
PATH REPORT.COMMENTS IMP SPEC: NORMAL
PATH REPORT.COMMENTS IMP SPEC: NORMAL
PATH REPORT.RELEVANT HX SPEC: NORMAL

## 2023-08-10 ENCOUNTER — LAB (OUTPATIENT)
Dept: LAB | Facility: OTHER | Age: 38
End: 2023-08-10
Payer: COMMERCIAL

## 2023-08-10 DIAGNOSIS — O24.410 DIET CONTROLLED GESTATIONAL DIABETES MELLITUS (GDM) IN THIRD TRIMESTER: ICD-10-CM

## 2023-08-10 LAB
EST. AVERAGE GLUCOSE BLD GHB EST-MCNC: 85 MG/DL
HBA1C MFR BLD: 4.6 %
HUMAN PAPILLOMA VIRUS 16 DNA: NEGATIVE
HUMAN PAPILLOMA VIRUS 18 DNA: NEGATIVE
HUMAN PAPILLOMA VIRUS FINAL DIAGNOSIS: NORMAL
HUMAN PAPILLOMA VIRUS OTHER HR: NEGATIVE
INSULIN SERPL-ACNC: 9.4 UU/ML (ref 2.6–24.9)

## 2023-08-10 PROCEDURE — 83036 HEMOGLOBIN GLYCOSYLATED A1C: CPT | Mod: ZL

## 2023-08-10 PROCEDURE — 36415 COLL VENOUS BLD VENIPUNCTURE: CPT | Mod: ZL

## 2023-08-10 PROCEDURE — 83525 ASSAY OF INSULIN: CPT | Mod: ZL

## 2024-08-14 ENCOUNTER — OFFICE VISIT (OUTPATIENT)
Dept: FAMILY MEDICINE | Facility: OTHER | Age: 39
End: 2024-08-14
Attending: FAMILY MEDICINE
Payer: COMMERCIAL

## 2024-08-14 ENCOUNTER — LAB (OUTPATIENT)
Dept: LAB | Facility: OTHER | Age: 39
End: 2024-08-14
Attending: FAMILY MEDICINE
Payer: COMMERCIAL

## 2024-08-14 VITALS
DIASTOLIC BLOOD PRESSURE: 68 MMHG | WEIGHT: 184.5 LBS | BODY MASS INDEX: 29.65 KG/M2 | HEART RATE: 67 BPM | SYSTOLIC BLOOD PRESSURE: 102 MMHG | TEMPERATURE: 98.8 F | RESPIRATION RATE: 16 BRPM | OXYGEN SATURATION: 97 % | HEIGHT: 66 IN

## 2024-08-14 DIAGNOSIS — E55.9 HYPOVITAMINOSIS D: ICD-10-CM

## 2024-08-14 DIAGNOSIS — G47.09 OTHER INSOMNIA: ICD-10-CM

## 2024-08-14 DIAGNOSIS — E78.5 HYPERLIPIDEMIA LDL GOAL <130: ICD-10-CM

## 2024-08-14 DIAGNOSIS — F41.1 GAD (GENERALIZED ANXIETY DISORDER): Chronic | ICD-10-CM

## 2024-08-14 DIAGNOSIS — R79.89 LOW VITAMIN B12 LEVEL: ICD-10-CM

## 2024-08-14 DIAGNOSIS — N94.3 PREMENSTRUAL SYNDROME: ICD-10-CM

## 2024-08-14 DIAGNOSIS — F90.2 ATTENTION DEFICIT HYPERACTIVITY DISORDER (ADHD), COMBINED TYPE: Chronic | ICD-10-CM

## 2024-08-14 DIAGNOSIS — Z00.00 ROUTINE GENERAL MEDICAL EXAMINATION AT A HEALTH CARE FACILITY: Primary | ICD-10-CM

## 2024-08-14 DIAGNOSIS — K90.41 GLUTEN-SENSITIVE ENTEROPATHY: Chronic | ICD-10-CM

## 2024-08-14 DIAGNOSIS — N64.4 BREAST PAIN: ICD-10-CM

## 2024-08-14 LAB
ALBUMIN SERPL BCG-MCNC: 4.6 G/DL (ref 3.5–5.2)
ALP SERPL-CCNC: 50 U/L (ref 40–150)
ALT SERPL W P-5'-P-CCNC: 14 U/L (ref 0–50)
ANION GAP SERPL CALCULATED.3IONS-SCNC: 10 MMOL/L (ref 7–15)
AST SERPL W P-5'-P-CCNC: 16 U/L (ref 0–45)
BILIRUB SERPL-MCNC: 0.6 MG/DL
BUN SERPL-MCNC: 6.3 MG/DL (ref 6–20)
CALCIUM SERPL-MCNC: 9.3 MG/DL (ref 8.8–10.4)
CHLORIDE SERPL-SCNC: 104 MMOL/L (ref 98–107)
CHOLEST SERPL-MCNC: 127 MG/DL
CREAT SERPL-MCNC: 0.67 MG/DL (ref 0.51–0.95)
EGFRCR SERPLBLD CKD-EPI 2021: >90 ML/MIN/1.73M2
FASTING STATUS PATIENT QL REPORTED: NO
FASTING STATUS PATIENT QL REPORTED: NO
GLUCOSE SERPL-MCNC: 89 MG/DL (ref 70–99)
HCO3 SERPL-SCNC: 25 MMOL/L (ref 22–29)
HDLC SERPL-MCNC: 42 MG/DL
LDLC SERPL CALC-MCNC: 68 MG/DL
NONHDLC SERPL-MCNC: 85 MG/DL
POTASSIUM SERPL-SCNC: 4.1 MMOL/L (ref 3.4–5.3)
PROT SERPL-MCNC: 6.9 G/DL (ref 6.4–8.3)
SODIUM SERPL-SCNC: 139 MMOL/L (ref 135–145)
TRIGL SERPL-MCNC: 87 MG/DL
VIT B12 SERPL-MCNC: 376 PG/ML (ref 232–1245)
VIT D+METAB SERPL-MCNC: 39 NG/ML (ref 20–50)

## 2024-08-14 PROCEDURE — 82607 VITAMIN B-12: CPT | Mod: ZL

## 2024-08-14 PROCEDURE — 82306 VITAMIN D 25 HYDROXY: CPT | Mod: ZL

## 2024-08-14 PROCEDURE — 36415 COLL VENOUS BLD VENIPUNCTURE: CPT | Mod: ZL

## 2024-08-14 PROCEDURE — 80061 LIPID PANEL: CPT | Mod: ZL

## 2024-08-14 PROCEDURE — G0463 HOSPITAL OUTPT CLINIC VISIT: HCPCS

## 2024-08-14 PROCEDURE — 99395 PREV VISIT EST AGE 18-39: CPT | Performed by: FAMILY MEDICINE

## 2024-08-14 PROCEDURE — 99214 OFFICE O/P EST MOD 30 MIN: CPT | Mod: 25 | Performed by: FAMILY MEDICINE

## 2024-08-14 PROCEDURE — 80053 COMPREHEN METABOLIC PANEL: CPT | Mod: ZL

## 2024-08-14 SDOH — HEALTH STABILITY: PHYSICAL HEALTH: ON AVERAGE, HOW MANY MINUTES DO YOU ENGAGE IN EXERCISE AT THIS LEVEL?: 30 MIN

## 2024-08-14 SDOH — HEALTH STABILITY: PHYSICAL HEALTH: ON AVERAGE, HOW MANY DAYS PER WEEK DO YOU ENGAGE IN MODERATE TO STRENUOUS EXERCISE (LIKE A BRISK WALK)?: 2 DAYS

## 2024-08-14 SDOH — HEALTH STABILITY: PHYSICAL HEALTH: ON AVERAGE, HOW MANY MINUTES DO YOU ENGAGE IN EXERCISE AT THIS LEVEL?: PATIENT DECLINED

## 2024-08-14 ASSESSMENT — PATIENT HEALTH QUESTIONNAIRE - PHQ9
SUM OF ALL RESPONSES TO PHQ QUESTIONS 1-9: 3
SUM OF ALL RESPONSES TO PHQ QUESTIONS 1-9: 3
10. IF YOU CHECKED OFF ANY PROBLEMS, HOW DIFFICULT HAVE THESE PROBLEMS MADE IT FOR YOU TO DO YOUR WORK, TAKE CARE OF THINGS AT HOME, OR GET ALONG WITH OTHER PEOPLE: NOT DIFFICULT AT ALL

## 2024-08-14 ASSESSMENT — ANXIETY QUESTIONNAIRES
GAD7 TOTAL SCORE: 1
7. FEELING AFRAID AS IF SOMETHING AWFUL MIGHT HAPPEN: NOT AT ALL
GAD7 TOTAL SCORE: 1
8. IF YOU CHECKED OFF ANY PROBLEMS, HOW DIFFICULT HAVE THESE MADE IT FOR YOU TO DO YOUR WORK, TAKE CARE OF THINGS AT HOME, OR GET ALONG WITH OTHER PEOPLE?: NOT DIFFICULT AT ALL
3. WORRYING TOO MUCH ABOUT DIFFERENT THINGS: NOT AT ALL
GAD7 TOTAL SCORE: 1
1. FEELING NERVOUS, ANXIOUS, OR ON EDGE: NOT AT ALL
7. FEELING AFRAID AS IF SOMETHING AWFUL MIGHT HAPPEN: NOT AT ALL
2. NOT BEING ABLE TO STOP OR CONTROL WORRYING: NOT AT ALL
6. BECOMING EASILY ANNOYED OR IRRITABLE: SEVERAL DAYS
IF YOU CHECKED OFF ANY PROBLEMS ON THIS QUESTIONNAIRE, HOW DIFFICULT HAVE THESE PROBLEMS MADE IT FOR YOU TO DO YOUR WORK, TAKE CARE OF THINGS AT HOME, OR GET ALONG WITH OTHER PEOPLE: NOT DIFFICULT AT ALL
5. BEING SO RESTLESS THAT IT IS HARD TO SIT STILL: NOT AT ALL
4. TROUBLE RELAXING: NOT AT ALL

## 2024-08-14 ASSESSMENT — LIFESTYLE VARIABLES
SKIP TO QUESTIONS 9-10: 1
HOW MANY STANDARD DRINKS CONTAINING ALCOHOL DO YOU HAVE ON A TYPICAL DAY: 1 OR 2
AUDIT-C TOTAL SCORE: 1
HOW OFTEN DO YOU HAVE A DRINK CONTAINING ALCOHOL: MONTHLY OR LESS
HOW OFTEN DO YOU HAVE SIX OR MORE DRINKS ON ONE OCCASION: NEVER

## 2024-08-14 ASSESSMENT — PAIN SCALES - GENERAL: PAINLEVEL: NO PAIN (0)

## 2024-08-14 ASSESSMENT — SOCIAL DETERMINANTS OF HEALTH (SDOH): HOW OFTEN DO YOU GET TOGETHER WITH FRIENDS OR RELATIVES?: PATIENT DECLINED

## 2024-08-14 NOTE — PROGRESS NOTES
"Preventive Care Visit  [facility]  Aliyah Domínguez MD, Family Medicine  Aug 14, 2024      Assessment & Plan     Routine general medical examination at a health care facility  Follow-up 1 year    Hyperlipidemia LDL goal <130  improved  - Comprehensive metabolic panel; Future  - Lipid Profile (Chol, Trig, HDL, LDL calc); Future    Gluten-sensitive enteropathy  Recently tried some gluten and is having abdominal pain    Attention deficit hyperactivity disorder (ADHD), combined type  Recommend trying magnesium glycinate    ROBERT (generalized anxiety disorder)  stable    Other insomnia  Up with kids otherwise sleeps ok    Premenstrual syndrome  Has been out of vitamins for past month or so and cycle has been more irregular and heavy    Low vitamin B12 level  Labs pending  - Vitamin B12; Future    Hypovitaminosis D  Labs pending  - Vitamin D Deficiency; Future    Breast pain  Cyclical, re-start vitamins    Patient has been advised of split billing requirements and indicates understanding: Yes    BMI  Estimated body mass index is 29.78 kg/m  as calculated from the following:    Height as of this encounter: 1.676 m (5' 6\").    Weight as of this encounter: 83.7 kg (184 lb 8 oz).   Weight management plan: Discussed healthy diet and exercise guidelines    Counseling  Appropriate preventive services were addressed with this patient via screening, questionnaire, or discussion as appropriate for fall prevention, nutrition, physical activity, Tobacco-use cessation, weight loss and cognition.  Checklist reviewing preventive services available has been given to the patient.  Reviewed patient's diet, addressing concerns and/or questions.   She is at risk for lack of exercise and has been provided with information to increase physical activity for the benefit of her well-being.   The patient was instructed to see the dentist every 6 months.   She is at risk for psychosocial distress and has been provided with information to " reduce risk.     Patient was agreeable to this plan and had no further questions.  There are no Patient Instructions on file for this visit.    No follow-ups on file.    Calin Thibodeaux is a 39 year old, presenting for the following:  Physical, Lipids, and Anxiety        8/14/2024    10:32 AM   Additional Questions   Roomed by Tiffanie Vaughn   Accompanied by None         8/14/2024    10:32 AM   Patient Reported Additional Medications   Patient reports taking the following new medications None        Health Care Directive  Patient does not have a Health Care Directive or Living Will: Discussed advance care planning with patient; information given to patient to review.        HPI    Hyperlipidemia Follow-Up    Are you regularly taking any medication or supplement to lower your cholesterol?   No  Are you having muscle aches or other side effects that you think could be caused by your cholesterol lowering medication?  Patient is not on a cholesterol lowering medication     Anxiety   How are you doing with your anxiety since your last visit? Improved   Are you having other symptoms that might be associated with anxiety? Yes:  hard time falling asleep, and staying asleep   Have you had a significant life event? Financial Concerns   Are you feeling depressed? No  Do you have any concerns with your use of alcohol or other drugs? No    Social History     Tobacco Use    Smoking status: Never    Smokeless tobacco: Never    Tobacco comments:     no passive exposure   Vaping Use    Vaping status: Never Used   Substance Use Topics    Alcohol use: Not Currently     Comment: rarely 1/year    Drug use: Never         5/23/2023     9:53 AM 8/3/2023     1:03 PM 8/14/2024    10:29 AM   ROBERT-7 SCORE   Total Score 0 (minimal anxiety) 5 (mild anxiety) 1 (minimal anxiety)   Total Score 0 5 1         5/23/2023     9:52 AM 8/3/2023     1:02 PM 8/14/2024    10:23 AM   PHQ   PHQ-9 Total Score 1 6 3   Q9: Thoughts of better off  dead/self-harm past 2 weeks Not at all Not at all Not at all         8/14/2024    10:23 AM   Last PHQ-9   1.  Little interest or pleasure in doing things 0   2.  Feeling down, depressed, or hopeless 0   3.  Trouble falling or staying asleep, or sleeping too much 2   4.  Feeling tired or having little energy 1   5.  Poor appetite or overeating 0   6.  Feeling bad about yourself 0   7.  Trouble concentrating 0   8.  Moving slowly or restless 0   Q9: Thoughts of better off dead/self-harm past 2 weeks 0   PHQ-9 Total Score 3         8/14/2024    10:29 AM   ROBERT-7    1. Feeling nervous, anxious, or on edge 0   2. Not being able to stop or control worrying 0   3. Worrying too much about different things 0   4. Trouble relaxing 0   5. Being so restless that it is hard to sit still 0   6. Becoming easily annoyed or irritable 1   7. Feeling afraid, as if something awful might happen 0   ROBERT-7 Total Score 1   If you checked any problems, how difficult have they made it for you to do your work, take care of things at home, or get along with other people? Not difficult at all         8/14/2024   General Health   How would you rate your overall physical health? Good   Feel stress (tense, anxious, or unable to sleep) Only a little      (!) STRESS CONCERN      8/14/2024   Nutrition   Three or more servings of calcium each day? Yes   Diet: Gluten-free/reduced    Other   If other, please elaborate: dairy free   How many servings of fruit and vegetables per day? (!) 2-3   How many sweetened beverages each day? 0-1       Multiple values from one day are sorted in reverse-chronological order         8/14/2024   Exercise   Days per week of moderate/strenous exercise 2 days   Average minutes spent exercising at this level Patient declined      (!) EXERCISE CONCERN      8/14/2024   Social Factors   Frequency of gathering with friends or relatives Patient declined   Worry food won't last until get money to buy more Yes   Food not last or  not have enough money for food? Yes   Do you have housing? (Housing is defined as stable permanent housing and does not include staying ouside in a car, in a tent, in an abandoned building, in an overnight shelter, or couch-surfing.) Yes   Are you worried about losing your housing? No   Lack of transportation? No   Unable to get utilities (heat,electricity)? No      (!) FOOD SECURITY CONCERN PRESENT      8/14/2024   Dental   Dentist two times every year? (!) NO            8/14/2024   TB Screening   Were you born outside of the [place]? No          Today's PHQ-9 Score:       8/14/2024    10:23 AM   PHQ-9 SCORE   PHQ-9 Total Score MyChart 3 (Minimal depression)   PHQ-9 Total Score 3         8/14/2024   Substance Use   Alcohol more than 3/day or more than 7/wk Not Applicable   Do you use any other substances recreationally? No        Social History     Tobacco Use    Smoking status: Never    Smokeless tobacco: Never    Tobacco comments:     no passive exposure   Vaping Use    Vaping status: Never Used   Substance Use Topics    Alcohol use: Not Currently     Comment: rarely 1/year    Drug use: Never          Mammogram Screening - Patient under 40 years of age: Routine Mammogram Screening not recommended.         8/14/2024   STI Screening   New sexual partner(s) since last STI/HIV test? No        History of abnormal Pap smear: No - age 30- 64 PAP with HPV every 5 years recommended        Latest Ref Rng & Units 8/3/2023     2:11 PM 11/8/2021    10:44 AM 8/18/2015    12:00 AM   PAP / HPV   PAP  Negative for Intraepithelial Lesion or Malignancy (NILM)  Negative for Intraepithelial Lesion or Malignancy (NILM)     PAP (Historical)    NIL    HPV 16 DNA Negative Negative  Negative     HPV 18 DNA Negative Negative  Negative     Other HR HPV Negative Negative  Negative             8/14/2024   Contraception/Family Planning   Questions about contraception or family planning No           Reviewed and updated as needed this visit by  "Provider                    Past Medical History:   Diagnosis Date    ADHD     Anemia     after 1st pregnancy, hgb 6, ok for auto transfusion or for her mom to donate.    Gluten-sensitive enteropathy 2016    Insomnia 2008    Post partum depression     after 1st pregnancy     Past Surgical History:   Procedure Laterality Date     SECTION  2012    Pregnancy-full term \"Shelomoh\" \"Oscar\"     SECTION       SECTION  2014    Procedure:  SECTION;  Surgeon: Gaston Norton MD;  Location: [facility] OR    wisdom teeth extraction       OB History    Para Term  AB Living   5 3 3 0 2 3   SAB IAB Ectopic Multiple Live Births   2 0 0 0 3      # Outcome Date GA Lbr Abran/2nd Weight Sex Type Anes PTL Lv   5 Term 14 41w3d  4.508 kg (9 lb 15 oz) M CS-LTranv Spinal  LAUREANO      Name: CATHRYN ARENAS      Apgar1: 8  Apgar5: 9   4 Term 12   4.451 kg (9 lb 13 oz) M -SEC   [facility]O      Name: Harriet   3 Term 11/10/11   3.685 kg (8 lb 2 oz) M -SEC   [facility]O      Name: Saul   2 SAB            1 SAB               Obstetric Comments   2 SABs 7 weeks and under   Breast fed     Lab work is in process  Labs reviewed in EPIC  BP Readings from Last 3 Encounters:   24 102/68   23 108/62   23 99/66    Wt Readings from Last 3 Encounters:   24 83.7 kg (184 lb 8 oz)   23 84.6 kg (186 lb 6.4 oz)   23 83.5 kg (184 lb 1.6 oz)                  Patient Active Problem List   Diagnosis     delivery delivered    Hyperlipidemia LDL goal <130    ACP (advance care planning)    Attention deficit hyperactivity disorder (ADHD), combined type    Other insomnia    Gluten-sensitive enteropathy    ROBERT (generalized anxiety disorder)    Nausea    Dysmenorrhea    Dental anomaly    Family history of first-degree relative with cardiomyopathy    Premenstrual syndrome    Encounter for screening for other viral diseases    Cervical cancer screening    " "Diet controlled gestational diabetes mellitus (GDM) in third trimester    Low vitamin B12 level    Hypovitaminosis D     Past Surgical History:   Procedure Laterality Date     SECTION  2012    Pregnancy-full term \"Shelomoh\" \"Oscar\"     SECTION  2010     SECTION  2014    Procedure:  SECTION;  Surgeon: Gaston Norton MD;  Location: [facility]    wisdom teeth extraction         Social History     Tobacco Use    Smoking status: Never    Smokeless tobacco: Never    Tobacco comments:     no passive exposure   Substance Use Topics    Alcohol use: Not Currently     Comment: rarely 1/year     Family History   Problem Relation Age of Onset    Respiratory Mother         chronic bronchitis yearly    Depression Mother         fatigue    Diabetes Type 2  Mother 58    Hyperlipidemia Mother     GERD Father     Sleep Disorder Father     Irritable Bowel Syndrome Father     Hypertension Father     Cardiomyopathy Brother 22        large heart    Hypertension Brother     Morbid Obesity Brother     Varicose Veins Brother     Heart Disease Maternal Grandmother         CHF, lung ca??/mass    Dementia Maternal Grandfather     Emphysema Maternal Grandfather         +tobacco    Depression Paternal Grandmother         ADHD    Sleep Apnea Paternal Grandmother         insomnia?    Breast Cancer Paternal Grandmother 81    Coronary Artery Disease Paternal Grandfather         AMI    Cardiomyopathy Maternal Aunt     Breast Cancer Maternal Aunt 59         Current Outpatient Medications   Medication Sig Dispense Refill    UNABLE TO FIND 1 Tablespoonful Super beets      Ascorbic Acid (VITAMIN C PO) Take by mouth as needed (Patient not taking: Reported on 2024)      lactobacillus rhamnosus, GG, (CULTURELL) capsule Take 1 capsule by mouth as needed (Patient not taking: Reported on 2024)      Multiple Vitamins-Minerals (MULTIVITAMIN ADULT PO)  (Patient not taking: Reported on 2024)      Vitamin D3 " "(CHOLECALCIFEROL) 125 MCG (5000 UT) tablet Take by mouth daily (Patient not taking: Reported on 8/14/2024)      Zinc Acetate, Oral, (ZINC ACETATE PO)  (Patient not taking: Reported on 8/14/2024)       Allergies   Allergen Reactions    Dust Mite Extract      Other reaction(s): Sneezing    Latex Rash     Recent Labs   Lab Test 08/14/24  1019 08/10/23  0838 08/03/23  1331 10/25/21  0915 10/25/21  0915 01/17/19 2001 06/07/18  1102 04/12/17  1623 10/07/16  1208   A1C  --  4.6  --   --   --   --   --   --   --    LDL 68  --  70  --  71  --  46  --   --    HDL 42*  --  44*  --  44*  --  49*  --   --    TRIG 87  --  61  --  55  --  108  --   --    ALT 14  --  21  --  32 21 23   < > 19   CR 0.67  --  0.68   < > 0.72 0.70 0.62   < > 0.68   GFRESTIMATED >90  --  >90   < > >90 >90 >90   < > >90  Non  GFR Calc     GFRESTBLACK  --   --   --   --   --  >90 >90   < > >90  African American GFR Calc     POTASSIUM 4.1  --  3.9  --  3.9 3.8 4.2   < > 4.2   TSH  --   --   --   --   --  1.34  --   --  0.84    < > = values in this interval not displayed.        Review of Systems  Constitutional, HEENT, cardiovascular, pulmonary, GI, , musculoskeletal, neuro, skin, endocrine and psych systems are negative, except as otherwise noted.     Objective    Exam  /68 (BP Location: Left arm, Patient Position: Sitting, Cuff Size: Adult Regular)   Pulse 67   Temp 98.8  F (37.1  C) (Tympanic)   Resp 16   Ht 1.676 m (5' 6\")   Wt 83.7 kg (184 lb 8 oz)   SpO2 97%   BMI 29.78 kg/m     Estimated body mass index is 29.78 kg/m  as calculated from the following:    Height as of this encounter: 1.676 m (5' 6\").    Weight as of this encounter: 83.7 kg (184 lb 8 oz).    Physical Exam  GENERAL: alert and no distress  EYES: Eyes grossly normal to inspection, PERRL and conjunctivae and sclerae normal  HENT: ear canals and TM's normal, nose and mouth without ulcers or lesions  NECK: no adenopathy, no asymmetry, masses, or scars  RESP: " lungs clear to auscultation - no rales, rhonchi or wheezes  BREAST: normal without masses, tenderness or nipple discharge and no palpable axillary masses or adenopathy  CV: regular rate and rhythm, normal S1 S2, no S3 or S4, no murmur, click or rub, no peripheral edema  ABDOMEN: soft, nontender, no hepatosplenomegaly, no masses and bowel sounds normal   (female) w/bimanual: normal female external genitalia, normal urethral meatus, normal vaginal mucosa, and normal cervix/adnexa/uterus without masses or discharge  MS: no gross musculoskeletal defects noted, no edema  SKIN: no suspicious lesions or rashes  NEURO: Normal strength and tone, mentation intact and speech normal  PSYCH: mentation appears normal, affect normal/bright        Signed Electronically by: Aliyah Domínguez MD    Answers submitted by the patient for this visit:  Patient Health Questionnaire (Submitted on 8/14/2024)  If you checked off any problems, how difficult have these problems made it for you to do your work, take care of things at home, or get along with other people?: Not difficult at all  PHQ9 TOTAL SCORE: 3  ROBERT-7 (Submitted on 8/14/2024)  ROBERT 7 TOTAL SCORE: 1     personal hygiene/self feeding

## 2024-08-15 RX ORDER — UBIDECARENONE 75 MG
100 CAPSULE ORAL DAILY
Qty: 90 TABLET | Refills: 1 | Status: SHIPPED | OUTPATIENT
Start: 2024-08-15

## 2024-12-22 ENCOUNTER — HOSPITAL ENCOUNTER (EMERGENCY)
Facility: HOSPITAL | Age: 39
Discharge: HOME | End: 2024-12-22
Attending: NURSE PRACTITIONER | Admitting: NURSE PRACTITIONER
Payer: COMMERCIAL

## 2024-12-22 VITALS — RESPIRATION RATE: 18 BRPM | HEART RATE: 76 BPM | TEMPERATURE: 98.9 F | OXYGEN SATURATION: 96 %

## 2024-12-22 DIAGNOSIS — Z11.52 ENCOUNTER FOR SCREENING LABORATORY TESTING FOR COVID-19 VIRUS: Primary | ICD-10-CM

## 2024-12-22 LAB
FLUAV RNA SPEC QL NAA+PROBE: NEGATIVE
FLUBV RNA RESP QL NAA+PROBE: NEGATIVE
RSV RNA SPEC NAA+PROBE: NEGATIVE
SARS-COV-2 RNA RESP QL NAA+PROBE: NEGATIVE

## 2024-12-22 PROCEDURE — 87637 SARSCOV2&INF A&B&RSV AMP PRB: CPT | Performed by: NURSE PRACTITIONER

## 2024-12-22 PROCEDURE — 99213 OFFICE O/P EST LOW 20 MIN: CPT | Performed by: NURSE PRACTITIONER

## 2024-12-22 PROCEDURE — G0463 HOSPITAL OUTPT CLINIC VISIT: HCPCS

## 2024-12-22 ASSESSMENT — COLUMBIA-SUICIDE SEVERITY RATING SCALE - C-SSRS
2. HAVE YOU ACTUALLY HAD ANY THOUGHTS OF KILLING YOURSELF IN THE PAST MONTH?: NO
1. IN THE PAST MONTH, HAVE YOU WISHED YOU WERE DEAD OR WISHED YOU COULD GO TO SLEEP AND NOT WAKE UP?: NO
6. HAVE YOU EVER DONE ANYTHING, STARTED TO DO ANYTHING, OR PREPARED TO DO ANYTHING TO END YOUR LIFE?: NO

## 2024-12-22 ASSESSMENT — ENCOUNTER SYMPTOMS
CHILLS: 0
SHORTNESS OF BREATH: 0
FEVER: 0
COUGH: 0

## 2024-12-22 ASSESSMENT — ACTIVITIES OF DAILY LIVING (ADL): ADLS_ACUITY_SCORE: 41

## 2024-12-23 NOTE — DISCHARGE INSTRUCTIONS
We will notify you of your results when available.     Follow up with your doctor as needed.    Return to urgent care or emergency department as needed.

## 2024-12-23 NOTE — ED PROVIDER NOTES
History   No chief complaint on file.    HPI  Morelia Rodriguez is a 39 year old female who presents to urgent care requesting a COVID-19 test.  Concerned about possible exposure to her brother who recently tested positive for COVID-19.  Patient denies fever, chills, cough, shortness of breath or chest pain.  No other concerns at this time.  Allergies:  Allergies   Allergen Reactions    Dust Mite Extract      Other reaction(s): Sneezing    Latex Rash       Problem List:    Patient Active Problem List    Diagnosis Date Noted    Low vitamin B12 level 08/14/2024     Priority: Medium    Hypovitaminosis D 08/14/2024     Priority: Medium    Breast pain 08/14/2024     Priority: Medium    Cervical cancer screening 08/03/2023     Priority: Medium    Diet controlled gestational diabetes mellitus (GDM) in third trimester 08/03/2023     Priority: Medium    Premenstrual syndrome 11/08/2021     Priority: Medium    Encounter for screening for other viral diseases 11/08/2021     Priority: Medium    Dental anomaly 10/13/2021     Priority: Medium    Family history of first-degree relative with cardiomyopathy 10/13/2021     Priority: Medium    ROBERT (generalized anxiety disorder) 06/07/2018     Priority: Medium    Nausea 06/07/2018     Priority: Medium    Dysmenorrhea 06/07/2018     Priority: Medium    Attention deficit hyperactivity disorder (ADHD), combined type 04/04/2018     Priority: Medium    Other insomnia 04/04/2018     Priority: Medium    Gluten-sensitive enteropathy 04/04/2018     Priority: Medium    ACP (advance care planning) 10/07/2016     Priority: Medium     Advance Care Planning 10/7/2016: ACP Review of Chart / Resources Provided:  Reviewed chart for advance care plan.  Morelia Rodriguez has no plan or code status on file. Discussed available resources and provided with information. Confirmed code status reflects current choices pending further ACP discussions.  Confirmed/documented legally designated decision makers.   "Added by Jana Mcneal            Hyperlipidemia LDL goal <130 2015     Priority: Medium     Diagnosis updated by automated process. Provider to review and confirm.       delivery delivered 2014     Priority: Medium     [facility] patient had transferred to [place]  For  but at last minute they said not a good candidate and patient wanted delivery here          Past Medical History:    Past Medical History:   Diagnosis Date    ADHD     Anemia     Gluten-sensitive enteropathy 2016    Insomnia 2008    Post partum depression        Past Surgical History:    Past Surgical History:   Procedure Laterality Date     SECTION  2012    Pregnancy-full term \"Shelomoh\" \"Oscar\"     SECTION       SECTION  2014    Procedure:  SECTION;  Surgeon: Gaston Norton MD;  Location: [facility]    wisdom teeth extraction         Family History:    Family History   Problem Relation Age of Onset    Respiratory Mother         chronic bronchitis yearly    Depression Mother         fatigue    Diabetes Type 2  Mother 58    Hyperlipidemia Mother     GERD Father     Sleep Disorder Father     Irritable Bowel Syndrome Father     Hypertension Father     Cardiomyopathy Brother 22        large heart    Hypertension Brother     Morbid Obesity Brother     Varicose Veins Brother     Heart Disease Maternal Grandmother         CHF, lung ca??/mass    Dementia Maternal Grandfather     Emphysema Maternal Grandfather         +tobacco    Depression Paternal Grandmother         ADHD    Sleep Apnea Paternal Grandmother         insomnia?    Breast Cancer Paternal Grandmother 81    Coronary Artery Disease Paternal Grandfather         AMI    Cardiomyopathy Maternal Aunt     Breast Cancer Maternal Aunt 59       Social History:  Marital Status:   [4]  Social History     Tobacco Use    Smoking status: Never    Smokeless tobacco: Never    Tobacco comments:     no passive exposure   Vaping Use    Vaping " status: Never Used   Substance Use Topics    Alcohol use: Not Currently     Comment: rarely 1/year    Drug use: Never        Medications:    cyanocobalamin (VITAMIN B-12) 100 MCG tablet  Multiple Vitamins-Minerals (MULTIVITAMIN ADULT PO)  UNABLE TO FIND  Vitamin D3 (CHOLECALCIFEROL) 125 MCG (5000 UT) tablet          Review of Systems   Constitutional:  Negative for chills and fever.   Respiratory:  Negative for cough and shortness of breath.    All other systems reviewed and are negative.      Physical Exam   Pulse: 76  Temp: 98.9  F (37.2  C)  Resp: 18  SpO2: 96 %      Physical Exam  Vitals and nursing note reviewed.   Constitutional:       General: She is not in acute distress.     Appearance: Normal appearance. She is well-developed. She is not diaphoretic.   HENT:      Head: Normocephalic and atraumatic.   Eyes:      Conjunctiva/sclera: Conjunctivae normal.   Cardiovascular:      Rate and Rhythm: Normal rate and regular rhythm.      Heart sounds: Normal heart sounds.   Pulmonary:      Effort: Pulmonary effort is normal. No respiratory distress.      Breath sounds: Normal breath sounds. No wheezing, rhonchi or rales.   Abdominal:      General: Abdomen is flat.   Musculoskeletal:      Cervical back: Normal range of motion and neck supple.   Skin:     General: Skin is warm and dry.   Neurological:      Mental Status: She is alert and oriented to person, place, and time.         ED Course        Procedures         No results found for this or any previous visit (from the past 24 hours).    Medications - No data to display    Assessments & Plan (with Medical Decision Making)   39-year-old female that presented requesting a COVID-19 test with concern of a possible close exposure to her brother.  Patient is asymptomatic.  COVID-19 test was done with results pending.  She will be notified of results when available.  Follow-up with primary doctor as needed.  Return to urgent care/ER for any concerning symptoms.    I have  reviewed the nursing notes.    I have reviewed the findings, diagnosis, plan and need for follow up with the patient.  This document was prepared using a combination of typing and voice generated software.  While every attempt was made for accuracy, spelling and grammatical errors may exist.         Discharge Medication List as of 12/22/2024  6:03 PM          Final diagnoses:   Encounter for screening laboratory testing for COVID-19 virus       12/22/2024   [facility]       Mpofu, Anitra, CNP  12/22/24 [id number]

## 2025-07-08 ENCOUNTER — TELEPHONE (OUTPATIENT)
Dept: FAMILY MEDICINE | Facility: OTHER | Age: 40
End: 2025-07-08

## 2025-07-08 NOTE — TELEPHONE ENCOUNTER
Symptom or reason needing to speak to RN: Patient has red sore under her left breast that is oozing clear and occasionally oozing green that smells. Occassionally burns and itches. Patient has had for 2 weeks. Sore has only grown a little. Center of sore is indented. Patient reports not knowing what is going on. Please call patient to schedule appointment.     Best number to return call: [phone]      Best time to return call: Anytime